# Patient Record
Sex: MALE | Race: OTHER | Employment: OTHER | URBAN - METROPOLITAN AREA
[De-identification: names, ages, dates, MRNs, and addresses within clinical notes are randomized per-mention and may not be internally consistent; named-entity substitution may affect disease eponyms.]

---

## 2021-11-04 ENCOUNTER — APPOINTMENT (OUTPATIENT)
Dept: GENERAL RADIOLOGY | Age: 26
End: 2021-11-04
Attending: EMERGENCY MEDICINE

## 2021-11-04 ENCOUNTER — HOSPITAL ENCOUNTER (EMERGENCY)
Age: 26
Discharge: HOME | End: 2021-11-04
Attending: EMERGENCY MEDICINE

## 2021-11-04 VITALS
DIASTOLIC BLOOD PRESSURE: 74 MMHG | WEIGHT: 175 LBS | HEIGHT: 70 IN | HEART RATE: 84 BPM | TEMPERATURE: 98.6 F | OXYGEN SATURATION: 98 % | BODY MASS INDEX: 25.05 KG/M2 | SYSTOLIC BLOOD PRESSURE: 118 MMHG | RESPIRATION RATE: 18 BRPM

## 2021-11-04 DIAGNOSIS — S83.91XA SPRAIN OF RIGHT KNEE, UNSPECIFIED LIGAMENT, INITIAL ENCOUNTER: Primary | ICD-10-CM

## 2021-11-04 PROCEDURE — 96372 THER/PROPH/DIAG INJ SC/IM: CPT

## 2021-11-04 PROCEDURE — 73562 X-RAY EXAM OF KNEE 3: CPT

## 2021-11-04 PROCEDURE — 99284 EMERGENCY DEPT VISIT MOD MDM: CPT

## 2021-11-04 PROCEDURE — 74011250636 HC RX REV CODE- 250/636: Performed by: PHYSICIAN ASSISTANT

## 2021-11-04 RX ORDER — IBUPROFEN 800 MG/1
800 TABLET ORAL
Qty: 20 TABLET | Refills: 0 | Status: SHIPPED | OUTPATIENT
Start: 2021-11-04 | End: 2021-11-11

## 2021-11-04 RX ORDER — KETOROLAC TROMETHAMINE 30 MG/ML
30 INJECTION, SOLUTION INTRAMUSCULAR; INTRAVENOUS
Status: COMPLETED | OUTPATIENT
Start: 2021-11-04 | End: 2021-11-04

## 2021-11-04 RX ADMIN — KETOROLAC TROMETHAMINE 30 MG: 30 INJECTION, SOLUTION INTRAMUSCULAR at 14:46

## 2021-11-04 NOTE — ED PROVIDER NOTES
Is a 24-year-old male who presents with complaint of right knee pain and injury. He was playing soccer yesterday when another player went to get the ball from him but accidentally kicked him on the medial aspect of the right knee and he felt a pop in the lateral posterior portion of the knee. He felt like his knee went \"out of place and then back into place. He woke up this morning and his knee was swollen and states that he hardly got any sleep last night because of the pain. He did not take anything for his pain. He rates it as 9 out of 10. Pain is worse with attempting to flex the knee. No prior injury to the knee. The history is provided by the patient. Knee Injury  Pertinent negatives include no chest pain, no abdominal pain and no shortness of breath. No past medical history on file. No past surgical history on file. No family history on file. Social History     Socioeconomic History    Marital status: SINGLE     Spouse name: Not on file    Number of children: Not on file    Years of education: Not on file    Highest education level: Not on file   Occupational History    Not on file   Tobacco Use    Smoking status: Never Smoker    Smokeless tobacco: Not on file   Substance and Sexual Activity    Alcohol use: No    Drug use: No    Sexual activity: Not on file   Other Topics Concern    Not on file   Social History Narrative    Not on file     Social Determinants of Health     Financial Resource Strain:     Difficulty of Paying Living Expenses: Not on file   Food Insecurity:     Worried About Running Out of Food in the Last Year: Not on file    Ulices of Food in the Last Year: Not on file   Transportation Needs:     Lack of Transportation (Medical): Not on file    Lack of Transportation (Non-Medical):  Not on file   Physical Activity:     Days of Exercise per Week: Not on file    Minutes of Exercise per Session: Not on file   Stress:     Feeling of Stress : Not on file   Social Connections:     Frequency of Communication with Friends and Family: Not on file    Frequency of Social Gatherings with Friends and Family: Not on file    Attends Roman Catholic Services: Not on file    Active Member of Clubs or Organizations: Not on file    Attends Club or Organization Meetings: Not on file    Marital Status: Not on file   Intimate Partner Violence:     Fear of Current or Ex-Partner: Not on file    Emotionally Abused: Not on file    Physically Abused: Not on file    Sexually Abused: Not on file   Housing Stability:     Unable to Pay for Housing in the Last Year: Not on file    Number of [place] in the Last Year: Not on file    Unstable Housing in the Last Year: Not on file         ALLERGIES: Patient has no known allergies. Review of Systems   Constitutional: Negative for chills and fever. HENT: Negative for sore throat. Respiratory: Negative for cough and shortness of breath. Cardiovascular: Negative for chest pain. Gastrointestinal: Negative for abdominal pain, nausea and vomiting. Musculoskeletal: Positive for joint swelling (right knee). Negative for back pain and neck pain. Right knee pain   Neurological: Negative for dizziness, weakness and numbness. Psychiatric/Behavioral: Negative for agitation and confusion. Vitals:    11/04/21 1302   BP: 118/74   Pulse: 85   Resp: 18   Temp: 98.6 °F (37 °C)   SpO2: 98%   Weight: 79.4 kg (175 lb)   Height: 5' 10\" (1.778 m)            Physical Exam  Vitals and nursing note reviewed. Constitutional:       General: He is not in acute distress. Appearance: He is not ill-appearing or toxic-appearing. HENT:      Head: Normocephalic and atraumatic. Cardiovascular:      Rate and Rhythm: Normal rate. Pulses: Normal pulses. Pulmonary:      Effort: Pulmonary effort is normal.   Musculoskeletal:      Right hip: Normal.      Right knee: Swelling and effusion present. No deformity.  Decreased range of motion. Tenderness present over the medial joint line. Right ankle: Normal.   Skin:     General: Skin is warm and dry. Neurological:      Mental Status: He is alert and oriented to person, place, and time. Psychiatric:         Mood and Affect: Mood normal.         Behavior: Behavior normal.         Thought Content: Thought content normal.         Judgment: Judgment normal.          MDM  Number of Diagnoses or Management Options  Sprain of right knee, unspecified ligament, initial encounter  Diagnosis management comments: Patient is a 61-year-old male who presents with complaint of right knee pain and swelling following injury yesterday while playing soccer. X-rays show: 1. Small joint effusion without acute osseous abnormality. Results discussed with patient. Suspect ligamentous injury. He was placed in a knee immobilizer and provided with crutches instructed to wear immobilizer and crutches until reevaluated by orthopedics. Also advised to elevate and ice the knee on and off but no more than 10 minutes at a time. Will DC with prescription for Motrin 800 mg. He is given information for follow-up with [facility] for further evaluation. Discussed reasons to return to the ER. Patient agreeable to plan.                Procedures

## 2021-11-04 NOTE — ED TRIAGE NOTES
Pt to ED in a wheelchair after injurying his right knee playing soccer yesterday. Pt states he is unable to bear weight on the extremity and states taking nothing OTC for pain but states icing his knee last night. Pt states hearing a pop and felt it go out of place and then a second pop and the knee going back in to place.     Pt masked in triage

## 2021-11-04 NOTE — ED NOTES
I have reviewed discharge instructions with the patient. The patient verbalized understanding. Patient left ED via Discharge Method: ambulatory to Home with friend. Opportunity for questions and clarification provided. Patient given 1 scripts. To continue your aftercare when you leave the hospital, you may receive an automated call from our care team to check in on how you are doing. This is a free service and part of our promise to provide the best care and service to meet your aftercare needs.  If you have questions, or wish to unsubscribe from this service please call [phone]. Thank you for Choosing our [facility].

## 2021-11-04 NOTE — DISCHARGE INSTRUCTIONS
Rest and elevate the leg, apply ice to the knee on/off but no more than 10 minutes at a time. Take motrin every 6 hours for pain/swelling. Wear knee immobilizer and use crutches until further evaluation by ortho. Call [facility] office to schedule follow up appointment .

## 2021-11-30 ENCOUNTER — ANESTHESIA EVENT (OUTPATIENT)
Dept: SURGERY | Age: 26
End: 2021-11-30

## 2021-12-01 ENCOUNTER — HOSPITAL ENCOUNTER (OUTPATIENT)
Age: 26
Setting detail: OUTPATIENT SURGERY
Discharge: HOME | End: 2021-12-01
Attending: ORTHOPAEDIC SURGERY | Admitting: ORTHOPAEDIC SURGERY

## 2021-12-01 ENCOUNTER — ANESTHESIA (OUTPATIENT)
Dept: SURGERY | Age: 26
End: 2021-12-01

## 2021-12-01 VITALS
DIASTOLIC BLOOD PRESSURE: 77 MMHG | HEART RATE: 108 BPM | TEMPERATURE: 98 F | OXYGEN SATURATION: 98 % | RESPIRATION RATE: 20 BRPM | BODY MASS INDEX: 25.83 KG/M2 | WEIGHT: 180 LBS | SYSTOLIC BLOOD PRESSURE: 165 MMHG

## 2021-12-01 LAB
COVID-19 RAPID TEST, COVR: NOT DETECTED
SOURCE, COVRS: NORMAL

## 2021-12-01 PROCEDURE — L1832 KO ADJ JNT POS R SUP PRE CST: HCPCS | Performed by: ORTHOPAEDIC SURGERY

## 2021-12-01 PROCEDURE — 76942 ECHO GUIDE FOR BIOPSY: CPT | Performed by: ORTHOPAEDIC SURGERY

## 2021-12-01 PROCEDURE — 74011250636 HC RX REV CODE- 250/636: Performed by: NURSE ANESTHETIST, CERTIFIED REGISTERED

## 2021-12-01 PROCEDURE — 76010010054 HC POST OP PAIN BLOCK

## 2021-12-01 PROCEDURE — 77030010509 HC AIRWY LMA MSK TELE -A: Performed by: ANESTHESIOLOGY

## 2021-12-01 PROCEDURE — 87635 SARS-COV-2 COVID-19 AMP PRB: CPT

## 2021-12-01 PROCEDURE — 74011250636 HC RX REV CODE- 250/636: Performed by: PHYSICIAN ASSISTANT

## 2021-12-01 PROCEDURE — 2709999900 HC NON-CHARGEABLE SUPPLY: Performed by: ORTHOPAEDIC SURGERY

## 2021-12-01 PROCEDURE — 77030002934 HC SUT MCRYL J&J -B: Performed by: ORTHOPAEDIC SURGERY

## 2021-12-01 PROCEDURE — 76010000171 HC OR TIME 2 TO 2.5 HR INTENSV-TIER 1: Performed by: ORTHOPAEDIC SURGERY

## 2021-12-01 PROCEDURE — 74011250637 HC RX REV CODE- 250/637: Performed by: ORTHOPAEDIC SURGERY

## 2021-12-01 PROCEDURE — 77030008494 HC TBNG ARTHSC IRR ARTH -B: Performed by: ORTHOPAEDIC SURGERY

## 2021-12-01 PROCEDURE — C1713 ANCHOR/SCREW BN/BN,TIS/BN: HCPCS | Performed by: ORTHOPAEDIC SURGERY

## 2021-12-01 PROCEDURE — 77030006788 HC BLD SAW OSC STRY -B: Performed by: ORTHOPAEDIC SURGERY

## 2021-12-01 PROCEDURE — 77030037837: Performed by: ORTHOPAEDIC SURGERY

## 2021-12-01 PROCEDURE — 76010010054 HC POST OP PAIN BLOCK: Performed by: ORTHOPAEDIC SURGERY

## 2021-12-01 PROCEDURE — 76060000036 HC ANESTHESIA 2.5 TO 3 HR: Performed by: ORTHOPAEDIC SURGERY

## 2021-12-01 PROCEDURE — 74011000250 HC RX REV CODE- 250: Performed by: NURSE ANESTHETIST, CERTIFIED REGISTERED

## 2021-12-01 PROCEDURE — 77030003598 HC NDL MULT/FIRE ARTH -C: Performed by: ORTHOPAEDIC SURGERY

## 2021-12-01 PROCEDURE — 77030002922 HC SUT FBRWRE ARTH -B: Performed by: ORTHOPAEDIC SURGERY

## 2021-12-01 PROCEDURE — 74011250636 HC RX REV CODE- 250/636: Performed by: ANESTHESIOLOGY

## 2021-12-01 PROCEDURE — 74011250637 HC RX REV CODE- 250/637: Performed by: ANESTHESIOLOGY

## 2021-12-01 PROCEDURE — 77030037713 HC CLOSR DEV INCIS ZIP STRY -B: Performed by: ORTHOPAEDIC SURGERY

## 2021-12-01 PROCEDURE — 77030020274 HC MISC IMPL ORTHOPEDIC: Performed by: ORTHOPAEDIC SURGERY

## 2021-12-01 PROCEDURE — 76210000026 HC REC RM PH II 1 TO 1.5 HR: Performed by: ORTHOPAEDIC SURGERY

## 2021-12-01 PROCEDURE — 77030012894: Performed by: ORTHOPAEDIC SURGERY

## 2021-12-01 PROCEDURE — 77030003602 HC NDL NRV BLK BBMI -B: Performed by: ANESTHESIOLOGY

## 2021-12-01 PROCEDURE — 77030040922 HC BLNKT HYPOTHRM STRY -A: Performed by: ANESTHESIOLOGY

## 2021-12-01 PROCEDURE — 29888 ARTHRS AID ACL RPR/AGMNTJ: CPT | Performed by: ORTHOPAEDIC SURGERY

## 2021-12-01 PROCEDURE — 76210000016 HC OR PH I REC 1 TO 1.5 HR: Performed by: ORTHOPAEDIC SURGERY

## 2021-12-01 PROCEDURE — 77030003666 HC NDL SPINAL BD -A: Performed by: ORTHOPAEDIC SURGERY

## 2021-12-01 PROCEDURE — 77030038066 HC BLD SHVR ARTH -B: Performed by: ORTHOPAEDIC SURGERY

## 2021-12-01 PROCEDURE — 77030008009 HC PRB ARTHO ABLT ARTH -C: Performed by: ORTHOPAEDIC SURGERY

## 2021-12-01 PROCEDURE — 77030018673: Performed by: ORTHOPAEDIC SURGERY

## 2021-12-01 PROCEDURE — 77030031139 HC SUT VCRL2 J&J -A: Performed by: ORTHOPAEDIC SURGERY

## 2021-12-01 PROCEDURE — 77030002933 HC SUT MCRYL J&J -A: Performed by: ORTHOPAEDIC SURGERY

## 2021-12-01 PROCEDURE — 77030028714 HC DRL BIT PIN PASS S&N -C: Performed by: ORTHOPAEDIC SURGERY

## 2021-12-01 PROCEDURE — 77030022041 HC CUTR FLIP ARTH -D: Performed by: ORTHOPAEDIC SURGERY

## 2021-12-01 DEVICE — ANCHOR SUT ANTR CRUC LIGMNT W/ FIBERTAG TIGHTROPE: Type: IMPLANTABLE DEVICE | Site: KNEE | Status: FUNCTIONAL

## 2021-12-01 DEVICE — ANCHOR SUT ANTR CRUC LIGMNT W/ FIBERTAG ATTACH BTTN SYS: Type: IMPLANTABLE DEVICE | Site: KNEE | Status: FUNCTIONAL

## 2021-12-01 DEVICE — DEVICE GRFT FIX 4.75X19.1 MM BIOCOMPOSITE SWIVELOCK: Type: IMPLANTABLE DEVICE | Site: KNEE | Status: FUNCTIONAL

## 2021-12-01 DEVICE — BUTTON FIX W8XL12MM TI ATTCH SYS ALLGRFT CONSTRUCT FOR: Type: IMPLANTABLE DEVICE | Site: KNEE | Status: FUNCTIONAL

## 2021-12-01 RX ORDER — TRANEXAMIC ACID 100 MG/ML
INJECTION, SOLUTION INTRAVENOUS AS NEEDED
Status: DISCONTINUED | OUTPATIENT
Start: 2021-12-01 | End: 2021-12-01 | Stop reason: HOSPADM

## 2021-12-01 RX ORDER — CEFAZOLIN SODIUM/WATER 2 G/20 ML
2 SYRINGE (ML) INTRAVENOUS ONCE
Status: COMPLETED | OUTPATIENT
Start: 2021-12-01 | End: 2021-12-01

## 2021-12-01 RX ORDER — SODIUM CHLORIDE, SODIUM LACTATE, POTASSIUM CHLORIDE, CALCIUM CHLORIDE 600; 310; 30; 20 MG/100ML; MG/100ML; MG/100ML; MG/100ML
75 INJECTION, SOLUTION INTRAVENOUS CONTINUOUS
Status: DISCONTINUED | OUTPATIENT
Start: 2021-12-01 | End: 2021-12-01 | Stop reason: HOSPADM

## 2021-12-01 RX ORDER — SODIUM CHLORIDE 0.9 % (FLUSH) 0.9 %
5-40 SYRINGE (ML) INJECTION AS NEEDED
Status: DISCONTINUED | OUTPATIENT
Start: 2021-12-01 | End: 2021-12-01 | Stop reason: HOSPADM

## 2021-12-01 RX ORDER — MINERAL OIL
OIL (ML) MISCELLANEOUS AS NEEDED
Status: DISCONTINUED | OUTPATIENT
Start: 2021-12-01 | End: 2021-12-01 | Stop reason: HOSPADM

## 2021-12-01 RX ORDER — FLUMAZENIL 0.1 MG/ML
0.2 INJECTION INTRAVENOUS
Status: DISCONTINUED | OUTPATIENT
Start: 2021-12-01 | End: 2021-12-01 | Stop reason: HOSPADM

## 2021-12-01 RX ORDER — EPHEDRINE SULFATE/0.9% NACL/PF 50 MG/5 ML
SYRINGE (ML) INTRAVENOUS AS NEEDED
Status: DISCONTINUED | OUTPATIENT
Start: 2021-12-01 | End: 2021-12-01 | Stop reason: HOSPADM

## 2021-12-01 RX ORDER — MIDAZOLAM HYDROCHLORIDE 1 MG/ML
2 INJECTION, SOLUTION INTRAMUSCULAR; INTRAVENOUS ONCE
Status: COMPLETED | OUTPATIENT
Start: 2021-12-01 | End: 2021-12-01

## 2021-12-01 RX ORDER — LIDOCAINE HYDROCHLORIDE 20 MG/ML
INJECTION, SOLUTION EPIDURAL; INFILTRATION; INTRACAUDAL; PERINEURAL AS NEEDED
Status: DISCONTINUED | OUTPATIENT
Start: 2021-12-01 | End: 2021-12-01 | Stop reason: HOSPADM

## 2021-12-01 RX ORDER — HYDROMORPHONE HYDROCHLORIDE 2 MG/ML
0.5 INJECTION, SOLUTION INTRAMUSCULAR; INTRAVENOUS; SUBCUTANEOUS
Status: DISCONTINUED | OUTPATIENT
Start: 2021-12-01 | End: 2021-12-01 | Stop reason: HOSPADM

## 2021-12-01 RX ORDER — DEXAMETHASONE SODIUM PHOSPHATE 4 MG/ML
INJECTION, SOLUTION INTRA-ARTICULAR; INTRALESIONAL; INTRAMUSCULAR; INTRAVENOUS; SOFT TISSUE
Status: COMPLETED | OUTPATIENT
Start: 2021-12-01 | End: 2021-12-01

## 2021-12-01 RX ORDER — SODIUM CHLORIDE, SODIUM LACTATE, POTASSIUM CHLORIDE, CALCIUM CHLORIDE 600; 310; 30; 20 MG/100ML; MG/100ML; MG/100ML; MG/100ML
100 INJECTION, SOLUTION INTRAVENOUS CONTINUOUS
Status: DISCONTINUED | OUTPATIENT
Start: 2021-12-01 | End: 2021-12-01 | Stop reason: HOSPADM

## 2021-12-01 RX ORDER — ROPIVACAINE HYDROCHLORIDE 5 MG/ML
INJECTION, SOLUTION EPIDURAL; INFILTRATION; PERINEURAL
Status: COMPLETED | OUTPATIENT
Start: 2021-12-01 | End: 2021-12-01

## 2021-12-01 RX ORDER — PROPOFOL 10 MG/ML
INJECTION, EMULSION INTRAVENOUS AS NEEDED
Status: DISCONTINUED | OUTPATIENT
Start: 2021-12-01 | End: 2021-12-01 | Stop reason: HOSPADM

## 2021-12-01 RX ORDER — DIPHENHYDRAMINE HYDROCHLORIDE 50 MG/ML
12.5 INJECTION, SOLUTION INTRAMUSCULAR; INTRAVENOUS
Status: DISCONTINUED | OUTPATIENT
Start: 2021-12-01 | End: 2021-12-01 | Stop reason: HOSPADM

## 2021-12-01 RX ORDER — NALOXONE HYDROCHLORIDE 0.4 MG/ML
0.1 INJECTION, SOLUTION INTRAMUSCULAR; INTRAVENOUS; SUBCUTANEOUS AS NEEDED
Status: DISCONTINUED | OUTPATIENT
Start: 2021-12-01 | End: 2021-12-01 | Stop reason: HOSPADM

## 2021-12-01 RX ORDER — ACETAMINOPHEN 500 MG
1000 TABLET ORAL ONCE
Status: COMPLETED | OUTPATIENT
Start: 2021-12-01 | End: 2021-12-01

## 2021-12-01 RX ORDER — LIDOCAINE HYDROCHLORIDE 10 MG/ML
0.1 INJECTION INFILTRATION; PERINEURAL AS NEEDED
Status: DISCONTINUED | OUTPATIENT
Start: 2021-12-01 | End: 2021-12-01 | Stop reason: HOSPADM

## 2021-12-01 RX ORDER — ONDANSETRON 2 MG/ML
INJECTION INTRAMUSCULAR; INTRAVENOUS AS NEEDED
Status: DISCONTINUED | OUTPATIENT
Start: 2021-12-01 | End: 2021-12-01 | Stop reason: HOSPADM

## 2021-12-01 RX ORDER — FENTANYL CITRATE 50 UG/ML
INJECTION, SOLUTION INTRAMUSCULAR; INTRAVENOUS AS NEEDED
Status: DISCONTINUED | OUTPATIENT
Start: 2021-12-01 | End: 2021-12-01 | Stop reason: HOSPADM

## 2021-12-01 RX ORDER — FENTANYL CITRATE 50 UG/ML
100 INJECTION, SOLUTION INTRAMUSCULAR; INTRAVENOUS AS NEEDED
Status: COMPLETED | OUTPATIENT
Start: 2021-12-01 | End: 2021-12-01

## 2021-12-01 RX ORDER — OXYCODONE HYDROCHLORIDE 5 MG/1
5 TABLET ORAL
Status: COMPLETED | OUTPATIENT
Start: 2021-12-01 | End: 2021-12-01

## 2021-12-01 RX ORDER — SODIUM CHLORIDE 0.9 % (FLUSH) 0.9 %
5-40 SYRINGE (ML) INJECTION EVERY 8 HOURS
Status: DISCONTINUED | OUTPATIENT
Start: 2021-12-01 | End: 2021-12-01 | Stop reason: HOSPADM

## 2021-12-01 RX ADMIN — DEXAMETHASONE SODIUM PHOSPHATE 4 MG: 4 INJECTION, SOLUTION INTRAMUSCULAR; INTRAVENOUS at 09:05

## 2021-12-01 RX ADMIN — ROPIVACAINE HYDROCHLORIDE 25 ML: 5 INJECTION, SOLUTION EPIDURAL; INFILTRATION; PERINEURAL at 09:04

## 2021-12-01 RX ADMIN — FENTANYL CITRATE 50 MCG: 50 INJECTION INTRAMUSCULAR; INTRAVENOUS at 12:45

## 2021-12-01 RX ADMIN — FENTANYL CITRATE 50 MCG: 50 INJECTION INTRAMUSCULAR; INTRAVENOUS at 11:13

## 2021-12-01 RX ADMIN — PROPOFOL 350 MG: 10 INJECTION, EMULSION INTRAVENOUS at 10:54

## 2021-12-01 RX ADMIN — TRANEXAMIC ACID 1 G: 100 INJECTION, SOLUTION INTRAVENOUS at 10:59

## 2021-12-01 RX ADMIN — CEFAZOLIN 2 G: 1 INJECTION, POWDER, FOR SOLUTION INTRAVENOUS at 10:40

## 2021-12-01 RX ADMIN — FENTANYL CITRATE 100 MCG: 0.05 INJECTION, SOLUTION INTRAMUSCULAR; INTRAVENOUS at 09:01

## 2021-12-01 RX ADMIN — SODIUM CHLORIDE, SODIUM LACTATE, POTASSIUM CHLORIDE, AND CALCIUM CHLORIDE 75 ML/HR: 600; 310; 30; 20 INJECTION, SOLUTION INTRAVENOUS at 08:09

## 2021-12-01 RX ADMIN — HYDROMORPHONE HYDROCHLORIDE 0.5 MG: 2 INJECTION INTRAMUSCULAR; INTRAVENOUS; SUBCUTANEOUS at 13:27

## 2021-12-01 RX ADMIN — FENTANYL CITRATE 50 MCG: 50 INJECTION INTRAMUSCULAR; INTRAVENOUS at 12:04

## 2021-12-01 RX ADMIN — FENTANYL CITRATE 25 MCG: 50 INJECTION INTRAMUSCULAR; INTRAVENOUS at 11:59

## 2021-12-01 RX ADMIN — HYDROMORPHONE HYDROCHLORIDE 0.5 MG: 2 INJECTION INTRAMUSCULAR; INTRAVENOUS; SUBCUTANEOUS at 13:44

## 2021-12-01 RX ADMIN — ACETAMINOPHEN 1000 MG: 500 TABLET ORAL at 08:10

## 2021-12-01 RX ADMIN — ONDANSETRON 4 MG: 2 INJECTION INTRAMUSCULAR; INTRAVENOUS at 12:33

## 2021-12-01 RX ADMIN — Medication 10 MG: at 11:25

## 2021-12-01 RX ADMIN — OXYCODONE 5 MG: 5 TABLET ORAL at 13:38

## 2021-12-01 RX ADMIN — DEXAMETHASONE SODIUM PHOSPHATE 4 MG: 4 INJECTION, SOLUTION INTRA-ARTICULAR; INTRALESIONAL; INTRAMUSCULAR; INTRAVENOUS; SOFT TISSUE at 09:04

## 2021-12-01 RX ADMIN — LIDOCAINE HYDROCHLORIDE 70 MG: 20 INJECTION, SOLUTION EPIDURAL; INFILTRATION; INTRACAUDAL; PERINEURAL at 10:54

## 2021-12-01 RX ADMIN — FENTANYL CITRATE 25 MCG: 50 INJECTION INTRAMUSCULAR; INTRAVENOUS at 11:50

## 2021-12-01 RX ADMIN — MIDAZOLAM 2 MG: 1 INJECTION INTRAMUSCULAR; INTRAVENOUS at 09:00

## 2021-12-01 RX ADMIN — PROPOFOL 50 MG: 10 INJECTION, EMULSION INTRAVENOUS at 10:57

## 2021-12-01 RX ADMIN — SODIUM CHLORIDE, SODIUM LACTATE, POTASSIUM CHLORIDE, AND CALCIUM CHLORIDE: 600; 310; 30; 20 INJECTION, SOLUTION INTRAVENOUS at 11:58

## 2021-12-01 RX ADMIN — ROPIVACAINE HYDROCHLORIDE 20 ML: 5 INJECTION, SOLUTION EPIDURAL; INFILTRATION; PERINEURAL at 09:05

## 2021-12-01 NOTE — BRIEF OP NOTE
Brief Postoperative Note    Patient: Africa Wilkins  YOB: 1995  MRN: [id number]    Date of Procedure: 12/1/2021     Pre-Op Diagnosis: Right knee pain, unspecified chronicity [M25.561]  Internal derangement of right knee [M23.91]  Rupture of anterior cruciate ligament of right knee, subsequent encounter [H59.338S]    Post-Op Diagnosis: Same as preoperative diagnosis. Procedure(s):  RIGHT KNEE ARTHROSCOPY WITH ANTERIOR CRUCIATE LIGAMENT RECONSTRUCTION W/ QUAD AUTOGRAFT    Surgeon(s):  Dalia Moran MD    Surgical Assistant: Surg Asst-1: Ann Schuster    Anesthesia: Regional     Estimated Blood Loss (mL): Minimal    Complications: None    Specimens: * No specimens in log *     Implants:   Implant Name Type Inv.  Item Serial No.  Lot No. LRB No. Used Action   ANCHOR SUT ANTR CRUC LIGMNT W/ FIBERTAG ATTACH BTTN SYS - [id number]  ANCHOR SUT ANTR CRUC LIGMNT W/ FIBERTAG ATTACH BTTN SYS  [employer] [id number] Right 1 Implanted   ANCHOR SUT ANTR CRUC LIGMNT W/ [employer] - [id number]  ANCHOR SUT ANTR CRUC LIGMNT W/ [employer]  [address] [employer] [id number] Right 1 Implanted   BUTTON FIX [id number] TI ATTCH SYS ALLGRFT CONSTRUCT FOR - [id number]  BUTTON FIX [id number] TI ATTCH SYS ALLGRFT CONSTRUCT FOR  [employer] [id number] Right 1 Implanted   DEVICE GRFT FIX 4.75X19.1 MM BIOCOMPOSITE [employer] - [id number]  DEVICE GRFT FIX 4.75X19.1 MM BIOCOMPOSITE [employer] [id number] Right 1 Implanted       Drains: * No LDAs found *    Findings: see op note    Electronically Signed by Jadyn Nagel MD on 12/1/2021 at 12:43 PM

## 2021-12-01 NOTE — OP NOTES
Operative Note    Date of Surgery: 2021      Preoperative diagnosis:  Right knee pain, unspecified chronicity [M25.561]  Internal derangement of right knee [M23.91]  Rupture of anterior cruciate ligament of right knee, subsequent encounter [S83.511D]    Postoperative diagnosis: Right knee pain, unspecified chronicity [M25.561]  Internal derangement of right knee [M23.91]  Rupture of anterior cruciate ligament of right knee, subsequent encounter [S83.511D]    Surgeon(s) and Role:     Shell Castillo MD - Primary     Assistant:  Oksana Rinaldi,  assist, assisted during the procedure. She was necessary for patient positioning, wound closure, and assistance with the major portions of the operation. Her presence decreased the operative time and potential complication rate. Anesthesia: General and regional.    Antibiotics: Ancef 2 grams IV    Tourniquet Time: * Missing tourniquet times found for documented tourniquets in lo *     Procedures:  Procedure(s):  RIGHT KNEE ARTHROSCOPY WITH ANTERIOR CRUCIATE LIGAMENT RECONSTRUCTION W/ QUAD AUTOGRAFT    02113    Findings:  1. EUA -   + lachman's and positive pivot shift. Stable to varus and valgus. 2. PFJ - Normal  3. Medial Joint -normal appearing meniscus, stable to probing, cartilage appeared normal      4. Lateral joint - normal appearing meniscus, stable to probing, cartilage appeared normal  5. PCL - stable and intact  6. ACL - acute tear of acl     Indications / Consent: This is a patient who feels unstable after an ACL tear and injury. After previous discussions and treatments using both conservative and/or non-operative treatment options the patient elected to proceed with surgery due to continued symptoms. A review of the risks and benefits, including but not limited to infection, stiffness, injury to nerves and vessels, DVT, PE, MI, need for further operations and other anesthesia related risks was performed with the patient.  After this review and the review of the likely outcome and potential complications of the procedure, preoperative verbal and written consents were obtained. The operative procedure and postoperative course were discussed with the patient in detail and the extremity was marked by the patient and myself. Procedure: The patient was given their anesthetic and placed in the supine position. An EUA was performed and positive for ACL instability as noted above. A lateral post was placed next to the operative leg and the nonoperative leg was well padded and lay secured on the table. A non-sterile tourniquet was applied to the operative leg. The leg was prepped with ChloraPrep and draped in the usual fashion. Prior to the beginning of the procedure, a time-out was performed for correct surgical site identification as was marked during the pre-operative meeting. This was confirmed using the written consent and history/physical. Time-out for antibiotic dosing, timing and selection was also performed. A 3 cm incision was made at the superior pole of patella. Using sharp dissection the small area of fatty tissue in the bursa were removed. Using blunt dissection with an elevator and a Ray-Quentin the soft tissue was removed from the superior surface of the quadriceps tendon and the VMO was identified. Using the camera from the scope the proximal extent of the quad was identified and the marker was placed on the skin superficially. The size 10 quadriceps pro cutting cannula was then marked with ink and placed on the quadriceps tendon. Using the 15 blade scalpel the medial and lateral sides of the distal graft were incised and then the quadriceps was resected off of the superior patella. A fiber loop was then placed at the distal graft. The Metzenbaum scissors were used to trim the medial and lateral sides of the distalmost aspect of the graft.   Using the quadriceps pro cutting device the graft was harvested without complication obtaining about a 67 mm graft. The graft was taken to the back table for preparation. Using 0 Vicryl the partial-thickness quadriceps graft site was then approximated. The skin layers were closed with #2 Monocryl and a 3 -0 strata fix. Circumferential graft stitches were then placed at 10 and 20 mm on the tibial side and 20 mm on the femoral side. The graft was sized and felt to be appropriate at 8.5 mm in diameter at the femoral end and about a 9.5 mm graft at the tibia. The graft was placed in a graft tube under 20 lbs. of tension on the back table. Then attention was turned back into the knee. The tourniquet was inflated to 250 mmhg. Using an 11 blade scalpel the scope was introduced through an anterolateral portal and the anteromedial portal was developed using spinal needle localization. The patellofemoral joint was viewed and felt to be unremarkable. The medial and lateral gutters were clear. The notch was then visualized and the ACL was noted to be torn. A probe was used to confirm this with a positive lateral wall sign seen. The scope was removed at this point and attention was turned to harvesting the graft. The scope was reinserted. The lateral compartment was viewed. The articular surface was normal. The lateral meniscus appeared to be normal and stable. The medial compartment was viewed, the articular surface was normal and  the medial meniscus was probed and noted to be normal.  The posteromedial and lateral compartments were viewed and otherwise normal.  A minimal notchplasty was performed. The ACL femoral and tibial footprints were cleared as much as needed. Placing the camera in the medial portal the flip cutter device guide was then placed over the anatomic side of the ACL footprint on the femur. An incision was made over the lateral distal thigh. The guide was set in place. The bone tunnel length was measured. The flip cutter was then advanced.   The appropriate sized reamer was then selected on the flip cutter device. It was back drilled approximately 20 mm. Bone debris was then cleared. A suture shuttle was then inserted. Attention was then turned to the tibial ACL attachment site. A tibial guide was then placed. The scope was switched to the lateral portal.  Using a similar technique the flip cutter device was then inserted into the center portion of the anatomic footprint of the tibia. The reamer was then set and backed drilled approximately 35 mm. A suture shuttle was then inserted. The graft was then obtained and removed from the graft tube. The button sutures were attached to this passing suture. This was then pulled through the tibia and femur. The button was pulled up into position under direct visualization into the femur and it was rotated. The graft was advanced and then the tibial loop was passed. An ABS button was attached to that side and the tibial graft component was brought into the tunnel. Once the graft was appropriately and the tibia of the femoral side was then further advanced so that appropriate amount of tendon was within the bone tunnels. This is been preoperatively marked on the graft. The knee was placed through a range of motion of at least 15 cycles. After the graft had been checked arthroscopically noting no impingement. In full extension the graft was tightened again on both the femur and the tibial side. The graft was checked and had appropriate position and tension. The Lachman's was stable and there was no longer a pivot shift noted. A drill was then used on the tibia and the tap was placed. A swivel lock anchor was placed with the ABS and other sutures into it. Backup fixation was then provided. The tourniquet was let down at this point. The subcutaneous tissue was closed with 2-0 Monocryl, skin was closed with zip loop.  The portals were closed with interrupted monofilament suture and steri-strips; sterile honeycomb dressings, SILVANA hose, and knee immobilizer were placed on the knee. The patient was returned to the recovery room in satisfactory condition. There were no known intraoperative complications. All counts were correct. Post-operative plan:  Post operative instructions are provided. Patient will be TDWB on the operative leg until otherwise instructed by PT or MD. They will start PT within a couple of days working on an ACL protocol. I will talk with the family and contact them on POD # 1. Estimated Blood Loss:   minimal.      Fluids:    see anesthesia records. Implant:   Implant Name Type Inv.  Item Serial No.  Lot No. LRB No. Used Action   ANCHOR SUT ANTR CRUC LIGMNT W/ FIBERTAG ATTACH BTTN SYS - [id number]  ANCHOR SUT ANTR CRUC LIGMNT W/ FIBERTAG ATTACH BTTN SYS  [employer] [id number] Right 1 Implanted   ANCHOR SUT ANTR CRUC LIGMNT W/ [employer] - [id number]  ANCHOR SUT ANTR CRUC LIGMNT W/ [employer]  [address] [employer] [id number] Right 1 Implanted   BUTTON FIX [id number] TI ATTCH SYS ALLGRFT CONSTRUCT FOR - [id number]  BUTTON FIX [id number] TI ATTCH SYS ALLGRFT CONSTRUCT FOR  [employer] [id number] Right 1 Implanted   DEVICE GRFT FIX 4.75X19.1 MM BIOCOMPOSITE [facility] - [id number]  DEVICE GRFT FIX 4.75X19.1 MM BI[employer]OMPOSITE [employer] [id number] Right 1 Implanted       Closure: Primary    Complications: None    Signed By: Kristyn Garcia MD

## 2021-12-01 NOTE — ANESTHESIA PROCEDURE NOTES
Peripheral Block    Start time: 12/1/2021 9:00 AM  End time: 12/1/2021 9:04 AM  Performed by: Allen Ward MD  Authorized by: Allen Ward MD       Pre-procedure:    Indications: at surgeon's request and post-op pain management    Preanesthetic Checklist: patient identified, risks and benefits discussed, site marked, timeout performed, anesthesia consent given and patient being monitored    Timeout Time: 09:00 EST          Block Type:   Block Type:  Popliteal  Laterality:  Right  Monitoring:  Standard ASA monitoring, continuous pulse ox, heart rate, oxygen, responsive to questions and frequent vital sign checks  Injection Technique:  Single shot  Procedures: ultrasound guided    Patient Position: supine  Prep: chlorhexidine    Location:  Lower thigh  Needle Type:  Stimuplex  Needle Gauge:  20 G  Needle Localization:  Ultrasound guidance  Medication Injected:  Ropivacaine (PF) (NAROPIN)(0.5%) 5 mg/mL injection, 25 mL  dexamethasone (DECADRON) 4 mg/mL injection, 4 mg  Med Admin Time: 12/1/2021 9:04 AM    Assessment:  Number of attempts:  1  Injection Assessment:  Incremental injection every 5 mL, local visualized surrounding nerve on ultrasound, negative aspiration for blood, no paresthesia, ultrasound image on chart and no intravascular symptoms  Patient tolerance:  Patient tolerated the procedure well with no immediate complications

## 2021-12-01 NOTE — ANESTHESIA PROCEDURE NOTES
Peripheral Block    Start time: 12/1/2021 9:04 AM  End time: 12/1/2021 9:05 AM  Performed by: Berna Pierre MD  Authorized by: Berna Pierre MD       Pre-procedure: Indications: at surgeon's request and post-op pain management    Preanesthetic Checklist: patient identified, risks and benefits discussed, site marked, timeout performed, anesthesia consent given and patient being monitored    Timeout Time: 09:04 EST          Block Type:   Block Type:   Adductor canal  Laterality:  Right  Monitoring:  Standard ASA monitoring, continuous pulse ox, heart rate, responsive to questions, oxygen and frequent vital sign checks  Injection Technique:  Single shot  Procedures: ultrasound guided    Patient Position: supine  Prep: chlorhexidine    Location:  Mid thigh  Needle Type:  Stimuplex  Needle Gauge:  20 G  Needle Localization:  Ultrasound guidance  Medication Injected:  Ropivacaine (PF) (NAROPIN)(0.5%) 5 mg/mL injection, 20 mL  dexamethasone (DECADRON) 4 mg/mL injection, 4 mg  Med Admin Time: 12/1/2021 9:05 AM    Assessment:  Number of attempts:  1  Injection Assessment:  Incremental injection every 5 mL, local visualized surrounding nerve on ultrasound, negative aspiration for blood, no intravascular symptoms, no paresthesia and ultrasound image on chart  Patient tolerance:  Patient tolerated the procedure well with no immediate complications

## 2021-12-01 NOTE — DISCHARGE INSTRUCTIONS
POST-OPERATIVE INFORMATION ACL RECONSTRUCTION    Returning Home  Your pain after surgery will vary depending on the method of anesthesia used and from patient to patient. In the first 24 hours, pain medication should be taken regularly with small amounts of food. During this time, nausea and light-headedness are common and should improve in 2-5 days. Drinking fluids may help. If nausea persists, medicine can be prescribed by calling your doctor at [phone]. Leaving the Outpatient Surgery Center:     As you leave the surgery center you might be given a CPM (continuous passive motion) machine and/or a Cold Therapy unit (Cryocuff or Game Ready). The CPM may be delivered to your house as well. The CPM is used to help maintain your motion. YOU MAY START THE CPM THE DAY AFTER YOUR SURGERY. You may remove your brace to use your CPM machine. If you are able to, sleep with the machine on. If you are not able to sleep with the CPM, sleep with the immobilizer and use the CPM machine multiple times throughout the day (4-6 hours per day). Each day you may increase the flexion setting as you tolerate. You do not have to go farther than 90 degrees of flexion if you eventually reach that. You may start the CPM on the day of your surgery or the following day. You may notice your bandage is a little more saturated once you begin the motion. For Cold Therapy, always have a layer in between your skin and the wrap. The cold is to be used to help control pain and swelling. Follow the instructions given to you on operating the machine. You may take the ice wrap off when you are not icing. Dont use the cold therapy while using the CPM.    For the first week:   1. When lying in bed keep your knee higher than your heart to help with swelling. 2. Use crutches when out of bed. 3. When walking, you may touch your foot to the ground for balance as you feel comfortable.    4. The cryotherapy cold sleeve, will be put on in recovery to control swelling. The position of the cryocuff is critical. Make sure the cuff is empty when you tighten it down and then fill it. Make the top strap snug and the bottom strap looser. Re-chill the water once an hour or as needed. Take the cold therapy wrap off when going outside the home. 5. Wear immobilizer to sleep at night and when you are up and about. You may take the immobilizer off at home to work on knee and ankle motion. Care of your Incisions  1. The incision is often checked 6 to 10 days after surgery. 2. Moderate bleeding may occur at the incision sites. This should decrease quickly over time. 3. Leave the dressings from surgery in place for 3-4 days. The bulky dressings may be removed and replaced with fresh gauze at that time, but leave on the small tape strips on the incision sites. Watch the wound for increasing redness, tenderness, swelling, and pus drainage daily. These can be early signs of infection. If you notice any of these signs of infection please call at [phone]. A mild fever during the first few days after surgery is not uncommon. This often occurs and can be treated with deep breathing, coughing to clear the lungs, and walking with crutches. However, fevers, increasing pain, and swelling at the incisions should be reported immediately. Showering:   Until your first post operative visit, you should consider wrapping your knee in saran wrap with tape for showering.  A plastic chair in your shower will allow you to sit.  Sponge bathing is also an option.  In general, once cleared by your physician you may allow your incisions to get wet in the shower. Post-Operative Pain Management  ANESTHESIA: You will meet with an anesthesiologist on the day of surgery to discuss your anesthesia. You will have general anesthesia and often will have a femoral nerve block.  This will wear off so be ready to begin you pain medications in order to prevent a long period without pain control. MEDICATIONS: You will be given a prescription for medications. Please take them as directed on the label and with food.  Certain pain medications may contain Tylenol(Acetaminophen). It is important not to take any additional Tylenol while on these pain medications.  Do not mix your pain medications with alcohol.  You should not drive while taking pain medications as they increase your liability and delay your responses.  Your physician will most likely prescribe to you Aspirin 325 mg (ECASA) daily for three weeks after surgery. This is done in order to help minimize the risk for a blood clot from developing, which is a possible complication after any surgery.  You will go home with white stockings on your legs called SILVANA hose. They are used to help with swelling and blood clots. After your first visit they will usually be discontinued.  If you have any questions or concerns regarding your medications, please call the office. · Common side effects of the narcotics include nausea, vomiting, drowsiness, constipation, and difficulty urinating. If you experience constipation, drink lots of water/Gatorade, avoid soda and diet drinks. Eat plenty of fiber. You may take a stool softener: Colace 100mg twice a day for the first week. For SEVERE constipation use magnesium citrate, one 8 oz bottle. All can be bought at the pharmacy. Diet and General Conditioning   Aerobic conditioning and diet are both very important after surgery. In general, we recommend that you make sure to avoid skipping meals, eat a balanced diet including regular portions of fruits and vegetables, and avoid relying on fast foods while you are recovering from surgery. Also, consider taking a daily multi-vitamin. Participate in some form of aerobic conditioning after surgery. Speak with your physical therapist or call our office to determine an appropriate form of exercise after surgery. Initially, your exercise will need to be modified after surgery. Follow-up Visits   Doctor  Plan on seeing your surgeon at 1 week, 1 month, 3 months, and 6 months after surgery. If your knee does not progress as planned, you are welcome to schedule additional visits. There is usually no charge for surgery related visits 90 days following surgery. You will receive a bill for any x-rays or special equipment (such as a brace). Physical Therapy   Your Therapist may schedule more visits depending on your progress.  First visit 2-5 days after surgery.  Weekly visits from week 1 through week 6.  Then every other week for one month.  Monthly from 3 months through 6 months. Physical Therapy  Goals for the first week  1. *Maintain maximum extension (straightening). 2. * Minimize or eliminate swelling. 3. Activate the thigh muscle. 4. Achieve greater than 90 degrees of bending. 5. Promote incision healing. Home Exercises Begin performing these exercises within the first 24 hours following surgery. 1. Throughout the day, take off the immobilizer and ice machine. Prop your foot up on pillows for 10 minutes so that your knee is not supported. Allow the knee to straighten fully. Do quad sets periodically by tightening the muscle on top of the thigh so that your knee cap moves toward you. Hold for five seconds and relax. 2. Perform heel slides multiple times throughout the day. Take the brace and ice off and slide your heel toward your bottom within a comfortable range of motion; help the leg with your hands. 3. Perform straight leg lifts in the knee immobilizer a couple times a day. Start by doing a quad set (above). Then lift your entire leg off the table starting with the heel. The knee should not bend. If it does, you should not perform this exercise. 4. Perform ankle pumps by moving foot up and down. Do these throughout the day. When to stop using  Immobilizer and crutches:  Your Physical Therapist will help you to determine the appropriate time to wean out of your brace and off of crutches. Use these guidelines to help.  Gradually wean off the crutches after the first 2-3 weeks. You may place as much weight on your leg in the brace with the crutches as you feel comfortable with. Begin with 2 crutches all the time, then one crutch at home and 2 outside, no crutches at home and one outside, no crutches.  When you can straighten your leg fully and do a straight leg lift you can wean off the immobilizer for day use, still sleep in it.  When your knee is straight for 3 weeks then you can wean out of the immobilizer when you sleep. Your knee must remain straight. Cold Therapy: You can discontinue using the cryocuff or game ready as the pain decreases. You may use cold therapy for control of pain and swelling. Use for 20-30 minutes at a time throughout the day as you desire. Issues after Surgery   Clicks and Pops- it is common for patients to experience sensations of clicks and pops in the first few months after surgery. It will resolve with time.  Pain around or just below the knee cap is common after surgery. It will resolve as your quad muscle strengthens with physical therapy. If you call the office please have us paged instead of leaving a voice mail so that we can immediately take care of your needs.    Phone [phone]              Fax [phone]

## 2021-12-01 NOTE — ANESTHESIA POSTPROCEDURE EVALUATION
Procedure(s):  RIGHT KNEE ARTHROSCOPY WITH ANTERIOR CRUCIATE LIGAMENT RECONSTRUCTION W/ QUAD AUTOGRAFT. general    Anesthesia Post Evaluation      Multimodal analgesia: multimodal analgesia used between 6 hours prior to anesthesia start to PACU discharge  Patient location during evaluation: PACU  Patient participation: complete - patient participated  Level of consciousness: awake and alert  Pain management: adequate  Airway patency: patent  Anesthetic complications: no  Cardiovascular status: acceptable  Respiratory status: acceptable  Hydration status: acceptable  Post anesthesia nausea and vomiting:  controlled  Final Post Anesthesia Temperature Assessment:  Normothermia (36.0-37.5 degrees C)      INITIAL Post-op Vital signs:   Vitals Value Taken Time   /88 12/01/21 1414   Temp 36.7 °C (98 °F) 12/01/21 1308   Pulse 100 12/01/21 1418   Resp 20 12/01/21 1345   SpO2 97 % 12/01/21 1418   Vitals shown include unvalidated device data.

## 2021-12-01 NOTE — H&P
Outpatient Surgery History and Physical:  Carlos Amador was seen and examined. CHIEF COMPLAINT:   Right knee pain. PE:  Visit Vitals  /64   Pulse 67   Temp 99 °F (37.2 °C)   Resp 14   Wt 180 lb (81.6 kg)   SpO2 99%   BMI 25.83 kg/m²       Heart:   Regular rhythm, regular pulses. Lungs:  Are clear, non-labored respirations. Past Medical History: There are no problems to display for this patient. Surgical History: History reviewed. No pertinent surgical history. Social History: Patient  reports that he has never smoked. He has never used smokeless tobacco. He reports current drug use. Drug: Marijuana. He reports that he does not drink alcohol. Family History: No family history on file. Allergies: Reviewed per EMR  No Known Allergies    Medications:    No current facility-administered medications on file prior to encounter. No current outpatient medications on file prior to encounter. The surgery is planned for the right knee. Quadriceps tendon autograft for ACL reconstruction, and possible meniscal repair vs partial menisectomy if found intraoperatively. History and physical has been reviewed. The patient has been examined. There have been no significant clinical changes since the completion of the originally dated History and Physical.  Patient identified by surgeon; surgical site was confirmed by patient and surgeon. The patient is here today for outpatient surgery. I have examined the patient, no changes are noted in the patient's medical status. Necessity for the procedure/care is still present and the history and physical above is current. See the office notes for the full long term history of the problem. Please see the recent office notes for the musculoskeletal examination. We have already discussed the clinical implications of both conservative and operative treatments. They would like to proceed with operative treatment.   I talked with them extensively about the risks, benefits, reasonable expectations and expected recovery time including long term need for ambulatory assistance as well as possible complications including but not limited to bleeding, infection, need for hardware removal or exchange, neurovascular injury, stiffness, pain, dislocation, continued problems, DVT, PE, hardware failure, other fracture, need for further surgery, heterotopic ossification, MI and other anesthesia related risks, etc. They have exhausted all other options and wish to proceed. The patient was counseled at length about the risks of lucy Covid-19 during their perioperative period and any recovery window from their procedure. The patient was made aware that lucy Covid-19  may worsen their prognosis for recovering from their procedure and lend to a higher morbidity and/or mortality risk. All material risks, benefits, and reasonable alternatives including postponing the procedure were discussed. The patient does  wish to proceed with the procedure at this time.       Signed By: Kristyn Garcia MD     December 1, 2021 7:07 AM

## 2021-12-01 NOTE — ANESTHESIA PREPROCEDURE EVALUATION
Relevant Problems   No relevant active problems       Anesthetic History   No history of anesthetic complications            Review of Systems / Medical History  Patient summary reviewed and pertinent labs reviewed    Pulmonary          Smoker         Neuro/Psych   Within defined limits           Cardiovascular  Within defined limits                Exercise tolerance: >4 METS: Prior to injury was active and denied chest pain, SOB, syncope      GI/Hepatic/Renal  Within defined limits              Endo/Other  Within defined limits           Other Findings              Physical Exam    Airway  Mallampati: II  TM Distance: 4 - 6 cm  Neck ROM: normal range of motion   Mouth opening: Normal     Cardiovascular  Regular rate and rhythm,  S1 and S2 normal,  no murmur, click, rub, or gallop  Rhythm: regular  Rate: normal         Dental  No notable dental hx       Pulmonary  Breath sounds clear to auscultation               Abdominal  GI exam deferred       Other Findings            Anesthetic Plan    ASA: 2  Anesthesia type: general  Plan for LMA    Post-op pain plan if not by surgeon: peripheral nerve block single    Induction: Intravenous  Anesthetic plan and risks discussed with: Patient

## 2021-12-17 ENCOUNTER — HOSPITAL ENCOUNTER (OUTPATIENT)
Dept: PHYSICAL THERAPY | Age: 26
Discharge: HOME | End: 2021-12-17
Attending: ORTHOPAEDIC SURGERY

## 2021-12-17 DIAGNOSIS — Z09 SURGERY FOLLOW-UP: ICD-10-CM

## 2021-12-17 DIAGNOSIS — S83.511S RUPTURE OF ANTERIOR CRUCIATE LIGAMENT OF RIGHT KNEE, SEQUELA: ICD-10-CM

## 2021-12-17 DIAGNOSIS — M25.561 RIGHT KNEE PAIN, UNSPECIFIED CHRONICITY: ICD-10-CM

## 2021-12-17 PROCEDURE — 97161 PT EVAL LOW COMPLEX 20 MIN: CPT

## 2021-12-17 NOTE — THERAPY EVALUATION
Irlanda Traylor  : 1995  Payor: /  [facility] [address] [facility], [facility], [facility] [address] [facility]  [phone]   [phone]                OUTPATIENT PHYSICAL THERAPY: Initial Assessment 2021     ICD-10 Treatment Diagnosis:  Rupture of anterior cruciate ligament of right knee, sequela (S83.511S)  Pain in right knee (M25.561)    PRECAUTIONS/ALLERGIES:  Patient has no known allergies. FALL RISK SCORE: 2 (? 5 = High Risk)    MD ORDERS: Eval and Treat MEDICAL/REFERRING DIAGNOSIS:  Rupture of anterior cruciate ligament of right knee, sequela [S83.511S]  Right knee pain, unspecified chronicity [M25.561]  Surgery follow-up [Z09]     DATE OF ONSET: 2021 surgery date    REFERRING PHYSICIAN: Lula Russ MD    RETURN PHYSICIAN APPOINTMENT: TBD by patient      Ambulatory/Rehab Services H2 Model Falls Risk Assessment    Risk Factors:       (1)  Gender [Male] Ability to Rise from Chair:       (1)  Pushes up, successful in one attempt    Falls Prevention Plan:       Physical Limitations to Exercise (specify):  decreased strength, ROM, balance   Total: (5 or greater = High Risk): 2     [facility] of [employer]. All Rights Reserved. [facility] Patent #[id number]. Federal Law prohibits the replication, distribution or use without written permission from [address] [place]: Mr. Lindsay Pickering presents to physical therapy for initial evaluation regarding complaints of R knee pain s/p R ACL reconstruction on 2021 per Dr. Osiel Meade. He demonstrates decreased strength, range of motion, and activity tolerance secondary to his impairments. His score on the LEFS objective measure indicates a significant disability and loss of functional mobility.  He would benefit from skilled physical therapy to address the aforementioned deficits and maximize independence to optimize functional mobility and return to his prior level of function. Following the assessment, Mr. Daphnie Farooq mother informed our clinic staff that Mr. Vero Naranjo would be participating in physical therapy elsewhere due to financial strain. PT emphasized the importance of consistent, intentional performance with a home exercise program, especially considering his delay in starting therapy after surgery. PT reviewed exercises with Mr. Vero Naranjo to initiate an exercise program until he starts therapy at his new clinic. He verbalized understanding of instruction. PROBLEM LIST:   1. Decreased Strength   2. Decreased ADL/Functional Activities  3. Decreased Transfer Abilities  4. Decreased Ambulation Ability/Techniques  5. Decreased Balance  6. Increased Pain  7. Decreased Activity Tolerance  8. Decreased [place] with Home Exercise Program      TREATMENT PLAN:  Effective Dates: 12/17/2021 TO 12/17/2021  Frequency/Duration: 1 time for 1 week    Thank you for this referral,    Isabella Ritter PT, DPT    Referring Physician Signature: Garcia Hicks MD *No signature required for this document. HISTORY:     HISTORY OF PRESENT INJURY / ILLNESS:  Mr. Vero Naranjo presents to physical therapy s/p R ACL reconstruction on 12/01/2021 per Dr. Matt Laguna. He reports his delayed start with PT was because he was not instructed when to start PT. He states his injury occurred the first week of November when he was playing intense pick-up soccer with some friends and someone came from behind him and swiped his feet out from underneath him. He notes he heard an audible \"pop\". He denies any history of previous injuries. He states he has been having some significant anxiety over the past few weeks and reports several \"attacks\" on occasion. He reports that he informed Dr. Matt Laguna. 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  He reports his highest rating of pain in the last week was a 5/10, while his lowest rating is a 1/10. He was previously independent with functional mobility and ADLs. PAST MEDICAL HISTORY/COMORBIDITIES:  Mr. Rafael Hernandez  has a past medical history of Marijuana smoker (11/29/2021). He has no past medical history of Dementia ([facility] [address] Endocrine disease, Gastrointestinal disorder, Infectious disease, Neurological disorder, or Sleep disorder. Mr. Rafael Hernandez  has no past surgical history on file. Active Ambulatory Problems     Diagnosis Date Noted    No Active Ambulatory Problems     Resolved Ambulatory Problems     Diagnosis Date Noted    No Resolved Ambulatory Problems     Past Medical History:   Diagnosis Date    Marijuana smoker 11/29/2021     CURRENT MEDICATIONS:   Current Outpatient Medications:     senna-docusate (PERICOLACE) 8.6-50 mg per tablet, Take 1 Tablet by mouth daily. To start after surgery  Indications: constipation, Disp: 21 Tablet, Rfl: 0    ondansetron (ZOFRAN ODT) 4 mg disintegrating tablet, 1 Tablet by SubLINGual route every six (6) hours as needed for Nausea or Nausea or Vomiting.  To start after surgery  Indications: prevent nausea and vomiting after surgery, Disp: 20 Tablet, Rfl: 0     Date Last Reviewed: 12/17/2021     Number of Personal Factors/Comorbidities that affect the Plan of Care: 1-2: MODERATE COMPLEXITY   EXAMINATION:     PALPATION Date: 12/17/21   Location of tenderness Global tenderness around R knee   Patellar mobility Limited in each direction     ACTIVE ROM Date: 12/17/21    RIGHT LEFT   Knee Extension 0 -5   Knee Flexion 30 140     FUNCTIONAL MOBILITY Date: 12/17/21   Bed mobility Increased time & effort to complete   Transfers Increased time & effort to complete   Gait Decreased isiah and step length with B crutches and knee immobilizer on R knee     NEUROLOGICAL SCREEN  - No radiating symptoms down leg     SKIN INTEGRITY  - Clean, dry, and intact with bandages applied     Body Structures Involved:  1. Bones  2. Joints  3. Muscles  4. Ligaments Body Functions Affected:  1. Sensory/Pain  2. Neuromusculoskeletal  3. Movement Related Activities and Participation Affected:  1. Mobility  2. Self Care  3. Domestic Life  4. Interpersonal Interactions and Relationships  5. Community, Social, and [place]   Number of elements that affect the Plan of Care: 4+: HIGH COMPLEXITY   CLINICAL PRESENTATION:   Presentation: Stable and uncomplicated: LOW COMPLEXITY   CLINICAL DECISION MAKING:   OUTCOME MEASURES:   Tool Used: Lower Extremity Functional Scale (LEFS)  Score: Initial: 23/80 (Date: 12/17/21)   Interpretation of Score: 20 questions each scored on a 5-point scale with 0 representing \"extreme difficulty or unable to perform\" and 4 representing \"no difficulty\". The lower the score, the greater the functional disability. 80/80 represents no disability. Minimal detectable change is 9 points. Payor: /     MEDICAL NECESSITY:  · Skilled intervention continues to be required due to above deficits affecting participation in basic ADLs and overall functional tolerance. REASON FOR SERVICES/ OTHER COMMENTS:  · Patient continues to require skilled intervention due to impairments affecting functional mobility. Use of outcome tool(s) and clinical judgment create a POC that gives a: Questionable prediction of patient's progress: MODERATE COMPLEXITY        TREATMENT/SESSION ASSESSMENT: Patient verbalized understanding of HEP and role of PT. Patient's mother informing staff at the end of the session that the patient would be attending a different clinic due to financial strain. PT emphasized the importance of consistent performance of home exercise program to promote knee ROM and quad strengthening. PT discussed at length the urgency of initiating quad engagement. He demonstrated understanding of education and instruction.     · Pain: Initial: 1/10 Post Session: 1/10 ·   Compliance with Program/Exercises: N/A  · Recommendations/Intent for next treatment session: Patient will be participating in therapy elsewhere.     TOTAL TREATMENT DURATION:  PT Patient Time In/Time Out  Time In: 1100  Time Out: Jared Abarca, PT, DPT

## 2022-03-01 PROBLEM — Z09 SURGERY FOLLOW-UP: Status: ACTIVE | Noted: 2022-03-01

## 2022-03-01 PROBLEM — S83.511A RUPTURE OF ANTERIOR CRUCIATE LIGAMENT OF RIGHT KNEE: Status: ACTIVE | Noted: 2022-03-01

## 2022-03-07 PROBLEM — M25.861 CYCLOPS LESION OF RIGHT KNEE: Status: ACTIVE | Noted: 2022-03-07

## 2022-03-12 ENCOUNTER — ANESTHESIA EVENT (OUTPATIENT)
Dept: SURGERY | Age: 27
End: 2022-03-12

## 2022-03-14 ENCOUNTER — HOSPITAL ENCOUNTER (OUTPATIENT)
Dept: PHYSICAL THERAPY | Age: 27
Discharge: HOME | End: 2022-03-14
Attending: ORTHOPAEDIC SURGERY

## 2022-03-14 ENCOUNTER — HOSPITAL ENCOUNTER (OUTPATIENT)
Age: 27
Setting detail: OUTPATIENT SURGERY
Discharge: HOME | End: 2022-03-14
Attending: ORTHOPAEDIC SURGERY | Admitting: ORTHOPAEDIC SURGERY

## 2022-03-14 ENCOUNTER — ANESTHESIA (OUTPATIENT)
Dept: SURGERY | Age: 27
End: 2022-03-14

## 2022-03-14 VITALS
RESPIRATION RATE: 16 BRPM | HEART RATE: 99 BPM | BODY MASS INDEX: 24.39 KG/M2 | SYSTOLIC BLOOD PRESSURE: 157 MMHG | TEMPERATURE: 97.6 F | WEIGHT: 170 LBS | DIASTOLIC BLOOD PRESSURE: 96 MMHG | OXYGEN SATURATION: 100 %

## 2022-03-14 PROBLEM — M24.561 FLEXION CONTRACTURE OF RIGHT KNEE: Status: ACTIVE | Noted: 2022-03-14

## 2022-03-14 PROCEDURE — 77030000032 HC CUF TRNQT ZIMM -B: Performed by: ORTHOPAEDIC SURGERY

## 2022-03-14 PROCEDURE — 76060000033 HC ANESTHESIA 1 TO 1.5 HR: Performed by: ORTHOPAEDIC SURGERY

## 2022-03-14 PROCEDURE — 77030010509 HC AIRWY LMA MSK TELE -A: Performed by: ANESTHESIOLOGY

## 2022-03-14 PROCEDURE — 74011250637 HC RX REV CODE- 250/637: Performed by: ANESTHESIOLOGY

## 2022-03-14 PROCEDURE — 76010000149 HC OR TIME 1 TO 1.5 HR: Performed by: ORTHOPAEDIC SURGERY

## 2022-03-14 PROCEDURE — 77030019908 HC STETH ESOPH SIMS -A: Performed by: ANESTHESIOLOGY

## 2022-03-14 PROCEDURE — 74011000250 HC RX REV CODE- 250: Performed by: NURSE ANESTHETIST, CERTIFIED REGISTERED

## 2022-03-14 PROCEDURE — 74011250636 HC RX REV CODE- 250/636: Performed by: ORTHOPAEDIC SURGERY

## 2022-03-14 PROCEDURE — 74011000258 HC RX REV CODE- 258: Performed by: NURSE ANESTHETIST, CERTIFIED REGISTERED

## 2022-03-14 PROCEDURE — 76210000026 HC REC RM PH II 1 TO 1.5 HR: Performed by: ORTHOPAEDIC SURGERY

## 2022-03-14 PROCEDURE — 2709999900 HC NON-CHARGEABLE SUPPLY: Performed by: ORTHOPAEDIC SURGERY

## 2022-03-14 PROCEDURE — 74011250636 HC RX REV CODE- 250/636: Performed by: NURSE ANESTHETIST, CERTIFIED REGISTERED

## 2022-03-14 PROCEDURE — 77030038066 HC BLD SHVR ARTH -B: Performed by: ORTHOPAEDIC SURGERY

## 2022-03-14 PROCEDURE — 29884 ARTHRS KNEE SURG LYSIS ADS: CPT | Performed by: ORTHOPAEDIC SURGERY

## 2022-03-14 PROCEDURE — 77030040936 HC WND COBLATN S&N -C: Performed by: ORTHOPAEDIC SURGERY

## 2022-03-14 PROCEDURE — 76210000063 HC OR PH I REC FIRST 0.5 HR: Performed by: ORTHOPAEDIC SURGERY

## 2022-03-14 PROCEDURE — 74011250636 HC RX REV CODE- 250/636: Performed by: ANESTHESIOLOGY

## 2022-03-14 RX ORDER — SODIUM CHLORIDE 0.9 % (FLUSH) 0.9 %
5-40 SYRINGE (ML) INJECTION AS NEEDED
Status: DISCONTINUED | OUTPATIENT
Start: 2022-03-14 | End: 2022-03-14 | Stop reason: HOSPADM

## 2022-03-14 RX ORDER — LIDOCAINE HYDROCHLORIDE 20 MG/ML
INJECTION, SOLUTION EPIDURAL; INFILTRATION; INTRACAUDAL; PERINEURAL AS NEEDED
Status: DISCONTINUED | OUTPATIENT
Start: 2022-03-14 | End: 2022-03-14 | Stop reason: HOSPADM

## 2022-03-14 RX ORDER — SODIUM CHLORIDE 0.9 % (FLUSH) 0.9 %
5-40 SYRINGE (ML) INJECTION EVERY 8 HOURS
Status: DISCONTINUED | OUTPATIENT
Start: 2022-03-14 | End: 2022-03-14 | Stop reason: HOSPADM

## 2022-03-14 RX ORDER — FLUMAZENIL 0.1 MG/ML
0.2 INJECTION INTRAVENOUS
Status: DISCONTINUED | OUTPATIENT
Start: 2022-03-14 | End: 2022-03-14 | Stop reason: HOSPADM

## 2022-03-14 RX ORDER — DEXAMETHASONE SODIUM PHOSPHATE 4 MG/ML
INJECTION, SOLUTION INTRA-ARTICULAR; INTRALESIONAL; INTRAMUSCULAR; INTRAVENOUS; SOFT TISSUE AS NEEDED
Status: DISCONTINUED | OUTPATIENT
Start: 2022-03-14 | End: 2022-03-14 | Stop reason: HOSPADM

## 2022-03-14 RX ORDER — ACETAMINOPHEN 500 MG
1000 TABLET ORAL ONCE
Status: COMPLETED | OUTPATIENT
Start: 2022-03-14 | End: 2022-03-14

## 2022-03-14 RX ORDER — PROPOFOL 10 MG/ML
INJECTION, EMULSION INTRAVENOUS AS NEEDED
Status: DISCONTINUED | OUTPATIENT
Start: 2022-03-14 | End: 2022-03-14 | Stop reason: HOSPADM

## 2022-03-14 RX ORDER — ONDANSETRON 2 MG/ML
INJECTION INTRAMUSCULAR; INTRAVENOUS AS NEEDED
Status: DISCONTINUED | OUTPATIENT
Start: 2022-03-14 | End: 2022-03-14 | Stop reason: HOSPADM

## 2022-03-14 RX ORDER — ROPIVACAINE HYDROCHLORIDE 5 MG/ML
INJECTION, SOLUTION EPIDURAL; INFILTRATION; PERINEURAL AS NEEDED
Status: DISCONTINUED | OUTPATIENT
Start: 2022-03-14 | End: 2022-03-14 | Stop reason: HOSPADM

## 2022-03-14 RX ORDER — LIDOCAINE HYDROCHLORIDE 10 MG/ML
0.1 INJECTION INFILTRATION; PERINEURAL AS NEEDED
Status: DISCONTINUED | OUTPATIENT
Start: 2022-03-14 | End: 2022-03-14 | Stop reason: HOSPADM

## 2022-03-14 RX ORDER — FENTANYL CITRATE 50 UG/ML
INJECTION, SOLUTION INTRAMUSCULAR; INTRAVENOUS AS NEEDED
Status: DISCONTINUED | OUTPATIENT
Start: 2022-03-14 | End: 2022-03-14 | Stop reason: HOSPADM

## 2022-03-14 RX ORDER — LABETALOL HYDROCHLORIDE 5 MG/ML
INJECTION, SOLUTION INTRAVENOUS AS NEEDED
Status: DISCONTINUED | OUTPATIENT
Start: 2022-03-14 | End: 2022-03-14 | Stop reason: HOSPADM

## 2022-03-14 RX ORDER — KETOROLAC TROMETHAMINE 30 MG/ML
INJECTION, SOLUTION INTRAMUSCULAR; INTRAVENOUS AS NEEDED
Status: DISCONTINUED | OUTPATIENT
Start: 2022-03-14 | End: 2022-03-14 | Stop reason: HOSPADM

## 2022-03-14 RX ORDER — KETAMINE HYDROCHLORIDE 50 MG/ML
INJECTION, SOLUTION INTRAMUSCULAR; INTRAVENOUS AS NEEDED
Status: DISCONTINUED | OUTPATIENT
Start: 2022-03-14 | End: 2022-03-14 | Stop reason: HOSPADM

## 2022-03-14 RX ORDER — HYDROMORPHONE HYDROCHLORIDE 2 MG/ML
INJECTION, SOLUTION INTRAMUSCULAR; INTRAVENOUS; SUBCUTANEOUS AS NEEDED
Status: DISCONTINUED | OUTPATIENT
Start: 2022-03-14 | End: 2022-03-14 | Stop reason: HOSPADM

## 2022-03-14 RX ORDER — CEFAZOLIN SODIUM/WATER 2 G/20 ML
2 SYRINGE (ML) INTRAVENOUS ONCE
Status: DISCONTINUED | OUTPATIENT
Start: 2022-03-14 | End: 2022-03-14 | Stop reason: HOSPADM

## 2022-03-14 RX ORDER — MIDAZOLAM HYDROCHLORIDE 1 MG/ML
INJECTION, SOLUTION INTRAMUSCULAR; INTRAVENOUS AS NEEDED
Status: DISCONTINUED | OUTPATIENT
Start: 2022-03-14 | End: 2022-03-14 | Stop reason: HOSPADM

## 2022-03-14 RX ORDER — DIPHENHYDRAMINE HYDROCHLORIDE 50 MG/ML
12.5 INJECTION, SOLUTION INTRAMUSCULAR; INTRAVENOUS
Status: DISCONTINUED | OUTPATIENT
Start: 2022-03-14 | End: 2022-03-14 | Stop reason: HOSPADM

## 2022-03-14 RX ORDER — SODIUM CHLORIDE, SODIUM LACTATE, POTASSIUM CHLORIDE, CALCIUM CHLORIDE 600; 310; 30; 20 MG/100ML; MG/100ML; MG/100ML; MG/100ML
75 INJECTION, SOLUTION INTRAVENOUS CONTINUOUS
Status: DISCONTINUED | OUTPATIENT
Start: 2022-03-14 | End: 2022-03-14 | Stop reason: HOSPADM

## 2022-03-14 RX ORDER — SODIUM CHLORIDE, SODIUM LACTATE, POTASSIUM CHLORIDE, CALCIUM CHLORIDE 600; 310; 30; 20 MG/100ML; MG/100ML; MG/100ML; MG/100ML
100 INJECTION, SOLUTION INTRAVENOUS CONTINUOUS
Status: DISCONTINUED | OUTPATIENT
Start: 2022-03-14 | End: 2022-03-14 | Stop reason: HOSPADM

## 2022-03-14 RX ORDER — NALOXONE HYDROCHLORIDE 0.4 MG/ML
0.1 INJECTION, SOLUTION INTRAMUSCULAR; INTRAVENOUS; SUBCUTANEOUS
Status: DISCONTINUED | OUTPATIENT
Start: 2022-03-14 | End: 2022-03-14 | Stop reason: HOSPADM

## 2022-03-14 RX ORDER — MIDAZOLAM HYDROCHLORIDE 1 MG/ML
2 INJECTION, SOLUTION INTRAMUSCULAR; INTRAVENOUS
Status: COMPLETED | OUTPATIENT
Start: 2022-03-14 | End: 2022-03-14

## 2022-03-14 RX ORDER — HYDROMORPHONE HYDROCHLORIDE 2 MG/ML
0.5 INJECTION, SOLUTION INTRAMUSCULAR; INTRAVENOUS; SUBCUTANEOUS
Status: DISCONTINUED | OUTPATIENT
Start: 2022-03-14 | End: 2022-03-14 | Stop reason: HOSPADM

## 2022-03-14 RX ORDER — OXYCODONE HYDROCHLORIDE 5 MG/1
5 TABLET ORAL
Status: DISCONTINUED | OUTPATIENT
Start: 2022-03-14 | End: 2022-03-14 | Stop reason: HOSPADM

## 2022-03-14 RX ORDER — HALOPERIDOL 5 MG/ML
1 INJECTION INTRAMUSCULAR
Status: DISCONTINUED | OUTPATIENT
Start: 2022-03-14 | End: 2022-03-14 | Stop reason: HOSPADM

## 2022-03-14 RX ORDER — SODIUM CHLORIDE, SODIUM LACTATE, POTASSIUM CHLORIDE, CALCIUM CHLORIDE 600; 310; 30; 20 MG/100ML; MG/100ML; MG/100ML; MG/100ML
INJECTION, SOLUTION INTRAVENOUS
Status: DISCONTINUED | OUTPATIENT
Start: 2022-03-14 | End: 2022-03-14 | Stop reason: HOSPADM

## 2022-03-14 RX ADMIN — PROPOFOL 300 MG: 10 INJECTION, EMULSION INTRAVENOUS at 07:21

## 2022-03-14 RX ADMIN — FENTANYL CITRATE 25 MCG: 50 INJECTION INTRAMUSCULAR; INTRAVENOUS at 07:34

## 2022-03-14 RX ADMIN — FENTANYL CITRATE 25 MCG: 50 INJECTION INTRAMUSCULAR; INTRAVENOUS at 07:36

## 2022-03-14 RX ADMIN — KETAMINE HYDROCHLORIDE 30 MG: 50 INJECTION INTRAMUSCULAR; INTRAVENOUS at 07:25

## 2022-03-14 RX ADMIN — DEXAMETHASONE SODIUM PHOSPHATE 4 MG: 4 INJECTION, SOLUTION INTRAMUSCULAR; INTRAVENOUS at 07:39

## 2022-03-14 RX ADMIN — PROPOFOL 10 MG: 10 INJECTION, EMULSION INTRAVENOUS at 07:47

## 2022-03-14 RX ADMIN — FENTANYL CITRATE 25 MCG: 50 INJECTION INTRAMUSCULAR; INTRAVENOUS at 07:24

## 2022-03-14 RX ADMIN — HYDROMORPHONE HYDROCHLORIDE 0.4 MG: 2 INJECTION INTRAMUSCULAR; INTRAVENOUS; SUBCUTANEOUS at 08:01

## 2022-03-14 RX ADMIN — SODIUM CHLORIDE, POTASSIUM CHLORIDE, SODIUM LACTATE AND CALCIUM CHLORIDE 100 ML/HR: 600; 310; 30; 20 INJECTION, SOLUTION INTRAVENOUS at 06:02

## 2022-03-14 RX ADMIN — FENTANYL CITRATE 25 MCG: 50 INJECTION INTRAMUSCULAR; INTRAVENOUS at 07:30

## 2022-03-14 RX ADMIN — LABETALOL HYDROCHLORIDE 5 MG: 5 INJECTION INTRAVENOUS at 07:53

## 2022-03-14 RX ADMIN — MIDAZOLAM 1 MG: 1 INJECTION INTRAMUSCULAR; INTRAVENOUS at 07:16

## 2022-03-14 RX ADMIN — MIDAZOLAM 2 MG: 1 INJECTION INTRAMUSCULAR; INTRAVENOUS at 07:01

## 2022-03-14 RX ADMIN — SODIUM CHLORIDE, SODIUM LACTATE, POTASSIUM CHLORIDE, AND CALCIUM CHLORIDE: 600; 310; 30; 20 INJECTION, SOLUTION INTRAVENOUS at 07:16

## 2022-03-14 RX ADMIN — TRANEXAMIC ACID 1 G: 100 INJECTION, SOLUTION INTRAVENOUS at 07:31

## 2022-03-14 RX ADMIN — LABETALOL HYDROCHLORIDE 2.5 MG: 5 INJECTION INTRAVENOUS at 08:01

## 2022-03-14 RX ADMIN — HYDROMORPHONE HYDROCHLORIDE 0.2 MG: 2 INJECTION INTRAMUSCULAR; INTRAVENOUS; SUBCUTANEOUS at 08:09

## 2022-03-14 RX ADMIN — PROPOFOL 50 MG: 10 INJECTION, EMULSION INTRAVENOUS at 07:26

## 2022-03-14 RX ADMIN — FENTANYL CITRATE 25 MCG: 50 INJECTION INTRAMUSCULAR; INTRAVENOUS at 07:45

## 2022-03-14 RX ADMIN — PROPOFOL 10 MG: 10 INJECTION, EMULSION INTRAVENOUS at 07:45

## 2022-03-14 RX ADMIN — FENTANYL CITRATE 25 MCG: 50 INJECTION INTRAMUSCULAR; INTRAVENOUS at 07:28

## 2022-03-14 RX ADMIN — KETOROLAC TROMETHAMINE 30 MG: 30 INJECTION, SOLUTION INTRAMUSCULAR; INTRAVENOUS at 08:12

## 2022-03-14 RX ADMIN — ACETAMINOPHEN 1000 MG: 500 TABLET, FILM COATED ORAL at 05:57

## 2022-03-14 RX ADMIN — LIDOCAINE HYDROCHLORIDE 100 MG: 20 INJECTION, SOLUTION EPIDURAL; INFILTRATION; INTRACAUDAL; PERINEURAL at 07:21

## 2022-03-14 RX ADMIN — ONDANSETRON 4 MG: 2 INJECTION INTRAMUSCULAR; INTRAVENOUS at 07:39

## 2022-03-14 RX ADMIN — FENTANYL CITRATE 25 MCG: 50 INJECTION INTRAMUSCULAR; INTRAVENOUS at 07:39

## 2022-03-14 RX ADMIN — FENTANYL CITRATE 25 MCG: 50 INJECTION INTRAMUSCULAR; INTRAVENOUS at 07:26

## 2022-03-14 NOTE — H&P
Outpatient Surgery History and Physical:  [facility] was seen and examined. CHIEF COMPLAINT:   Right knee stiffness and pain. PE:     Visit Vitals  /80 (BP 1 Location: Right arm, BP Patient Position: Supine)   Pulse 94   Temp 98.4 °F (36.9 °C)   Resp 18   Wt 170 lb (77.1 kg)   SpO2 99%   BMI 24.39 kg/m²       Heart:   Regular rhythm, regular pulses. Lungs:  Are clear, non-labored respirations. Past Medical History:    Patient Active Problem List    Diagnosis    Flexion contracture of right knee    Cyclops lesion of right knee    Surgery follow-up    Rupture of anterior cruciate ligament of right knee       Surgical History:   Past Surgical History:   Procedure Laterality Date    HX ACL RECONSTRUCTION Right 12/1/22    RIGHT KNEE ARTHROSCOPY WITH ANTERIOR CRUCIATE LIGAMENT RECONSTRUCTION W/ QUAD AUTOGRAFT     HX MYRINGOTOMY      tubes inserted       Social History: Patient  reports that he has never smoked. He has never used smokeless tobacco. He reports current alcohol use. He reports current drug use. Drug: Marijuana. Family History: No family history on file. Allergies: Reviewed per EMR  No Known Allergies    Medications:    No current facility-administered medications on file prior to encounter. No current outpatient medications on file prior to encounter. The surgery is planned for the right knee. RIGHT KNEE ARTHROSCOPY LYSIS OF ADHESIONS, MANIPULATION UNDER ANESTHESIA. History and physical has been reviewed. The patient has been examined. There have been no significant clinical changes since the completion of the originally dated History and Physical.  Patient identified by surgeon; surgical site was confirmed by patient and surgeon. The patient is here today for outpatient surgery. I have examined the patient, no changes are noted in the patient's medical status.  Necessity for the procedure/care is still present and the history and physical above is current. See the office notes for the full long term history of the problem. Please see the recent office notes for the musculoskeletal examination. We have already discussed the clinical implications of both conservative and operative treatments. They would like to proceed with operative treatment. I talked with them extensively about the risks, benefits, reasonable expectations and expected recovery time including long term need for ambulatory assistance as well as possible complications including but not limited to bleeding, infection, need for hardware removal or exchange, neurovascular injury, stiffness, pain, dislocation, continued problems, DVT, PE, hardware failure, other fracture, need for further surgery, heterotopic ossification, MI and other anesthesia related risks, etc. They have exhausted all other options and wish to proceed. The patient was counseled at length about the risks of lucy Covid-19 during their perioperative period and any recovery window from their procedure. The patient was made aware that lucy Covid-19  may worsen their prognosis for recovering from their procedure and lend to a higher morbidity and/or mortality risk. All material risks, benefits, and reasonable alternatives including postponing the procedure were discussed. The patient does  wish to proceed with the procedure at this time.       Signed By: Elier Anthony MD     March 14, 2022 6:56 AM

## 2022-03-14 NOTE — PROGRESS NOTES
Pt's appointment was cancelled today due to patient wanting to go to PT at [facility] instead of this clinic secondary to concerns about copay (per Dr. Mila Reilly office).     Angelito Reina, REGT  3/14/2022

## 2022-03-14 NOTE — ANESTHESIA PREPROCEDURE EVALUATION
Anesthetic History   No history of anesthetic complications            Review of Systems / Medical History  Patient summary reviewed and pertinent labs reviewed    Pulmonary          Smoker (Marijuana)         Neuro/Psych   Within defined limits           Cardiovascular  Within defined limits                Exercise tolerance: >4 METS: Prior to injury was active and denied chest pain, SOB, syncope      GI/Hepatic/Renal  Within defined limits              Endo/Other  Within defined limits           Other Findings            Physical Exam    Airway  Mallampati: II  TM Distance: 4 - 6 cm  Neck ROM: normal range of motion   Mouth opening: Normal     Cardiovascular  Regular rate and rhythm,  S1 and S2 normal,  no murmur, click, rub, or gallop  Rhythm: regular  Rate: normal         Dental  No notable dental hx       Pulmonary  Breath sounds clear to auscultation               Abdominal  GI exam deferred       Other Findings            Anesthetic Plan    ASA: 2  Anesthesia type: general  Plan for LMA    Post-op pain plan if not by surgeon: peripheral nerve block single    Induction: Intravenous  Anesthetic plan and risks discussed with: Patient

## 2022-03-14 NOTE — DISCHARGE INSTRUCTIONS
Postoperative Instructions - Knee Arthroscopy    Please note these are general instructions for postoperative care. Specific instructions may be given regarding the type of surgery that you have undergone. 1. Postoperative dressings may be removed after 2 to 3 days. Dressings should be kept dry for the first several days. Following removal of the dressing, wounds should be kept dry when showering. A large trash bag taped to the thigh can work well. Do not soak wounds in the tub prior to suture removal.      2. All steri-strip tapes across the wound should be left in place if your incisions have them. 3. Some discomfort is expected following any operation. You will be given a prescription for pain medication along with instructions for its use. Many pain medications contain Tylenol. Do not take additional Tylenol if you are currently taking the prescribed pain medications. Do not drive while taking pain medications. Do not make important personal or business decisions or sign legal documents the first 24 hours after surgery. If your pain is not adequately controlled, contact your surgeon on call at the numbers on this sheet. 4. Following simple knee arthroscopy crutches are not usually utilized or are used for only 1 or 2 days. Working on regaining full motion of the knee is very important. Bending and straightening the knee and performing ankle range of motion is encouraged to prevent blood clots. Try to use a stationary bike without resistance within the first week of surgery to help regain motion. You may use a brace to help keep it straight at night. 5. Elevating the lower extremity after surgery is helpful in the first few days postoperatively. This can help control swelling. Applying ice for 15 to 20 minutes per hour to the surgical site is often helpful in decreasing pain and swelling.     6. As pain subsides, discontinuation of narcotic medication is recommended and switching to Tylenol or over the counter anti-inflammatory medication is desirable. 7. Pain medications can cause constipation, nausea and vomiting, and sometimes itching and hives. Keeping hydrated by taking liquids on a regular basis can help. For constipation, consider over the counter additions like Metamucil or an over-the-counter stool softner. Trying to limit narcotic use can often help with regards to nausea and vomiting. Taking pain medication with a small amount of food is also usually helpful. Itching and hives can occur with pain medication as well as antibiotics that are given during the renato-operative time. Over the counter Benadryl (25mg every 6 hours) can be helpful in treating itching. 8. If you feel you are progressing slowly, feel free to call our office to get you into physical therapy if you do not already have a prescription. 9. Any fevers (101º or greater) after 2-3 days post-op, increasing redness, drainage, or steadily increasing pain please notify us or come in as soon as possible. 10. Deep vein thrombosis (DVT) is a condition in which a blood clot has formed in a deep vein of the leg. This may result in redness, swelling, warmth and/or pain of the leg. Although these are very rare, there are things that can be done to lessen the risk. - It is recommended by your surgeon unless indicated otherwise, after knee arthroscopy; take an adult aspirin once a day for three weeks after surgery to help prevent the formation of blood clots. (Do not take aspirin against the advice of your medical doctor). -   For several days (at least until you are walking about most of the day), rotate your  ankle, bend your toes and move your foot back and forth and from side to side, frequently (at least a few minutes every hour while you are awake) in order to keep blood circulation flowing so that the blood clotting will be less likely to occur.  The more circulation, the less chance of blood clotting and a DVT. If you call the office please have us paged instead of leaving a voice mail so that we can immediately take care of your needs. Phone [phone]              Fax [phone]          You have medication at [address] to  if you havent already done so. MEDICATION INTERACTION:  During your procedure you potentially received a medication or medications which may reduce the effectiveness of oral contraceptives. Please consider other forms of contraception for 1 month following your procedure if you are currently using oral contraceptives as your primary form of birth control. In addition to this, we recommend continuing your oral contraceptive as prescribed, unless otherwise instructed by your physician, during this time    After general anesthesia or intravenous sedation, for 24 hours or while taking prescription Narcotics:  · Limit your activities  · A responsible adult needs to be with you for the next 24 hours  · Do not drive and operate hazardous machinery  · Do not make important personal or business decisions  · Do not drink alcoholic beverages  · If you have not urinated within 8 hours after discharge, and you are experiencing discomfort from urinary retention, please go to the nearest ED. · If you have sleep apnea and have a CPAP machine, please use it for all naps and sleeping. · Please use caution when taking narcotics and any of your home medications that may cause drowsiness. *  Please give a list of your current medications to your Primary Care Provider. *  Please update this list whenever your medications are discontinued, doses are      changed, or new medications (including over-the-counter products) are added. *  Please carry medication information at all times in case of emergency situations.     These are general instructions for a healthy lifestyle:  No smoking/ No tobacco products/ Avoid exposure to second hand smoke  Surgeon General's Warning:  Quitting smoking now greatly reduces serious risk to your health. Obesity, smoking, and sedentary lifestyle greatly increases your risk for illness  A healthy diet, regular physical exercise & weight monitoring are important for maintaining a healthy lifestyle    You may be retaining fluid if you have a history of heart failure or if you experience any of the following symptoms:  Weight gain of 3 pounds or more overnight or 5 pounds in a week, increased swelling in our hands or feet or shortness of breath while lying flat in bed. Please call your doctor as soon as you notice any of these symptoms; do not wait until your next office visit.

## 2022-03-14 NOTE — ANESTHESIA POSTPROCEDURE EVALUATION
Procedure(s):  RIGHT KNEE ARTHROSCOPY LYSIS OF ADHESIONS, MANIPULATION CHOICE ANES. general    Anesthesia Post Evaluation      Multimodal analgesia: multimodal analgesia used between 6 hours prior to anesthesia start to PACU discharge  Patient location during evaluation: bedside  Patient participation: complete - patient participated  Level of consciousness: awake  Pain management: adequate  Airway patency: patent  Anesthetic complications: no  Cardiovascular status: acceptable  Respiratory status: spontaneous ventilation and acceptable  Hydration status: acceptable  Post anesthesia nausea and vomiting:  none      INITIAL Post-op Vital signs:   Vitals Value Taken Time   /74 03/14/22 0840   Temp 36.4 °C (97.6 °F) 03/14/22 0825   Pulse 86 03/14/22 0846   Resp 16 03/14/22 0842   SpO2 100 % 03/14/22 0846   Vitals shown include unvalidated device data.

## 2022-03-14 NOTE — BRIEF OP NOTE
Brief Postoperative Note    Patient: Jeanette Porras  YOB: 1995  MRN: [id number]    Date of Procedure: 3/14/2022     Pre-Op Diagnosis: Cyclops lesion of right knee [M25.861]  Surgery follow-up [Z09]  Rupture of anterior cruciate ligament of right knee, sequela [S83.511S]    Post-Op Diagnosis: Same as preoperative diagnosis.       Procedure(s):  RIGHT KNEE ARTHROSCOPY LYSIS OF ADHESIONS, MANIPULATION CHOICE ANES    Surgeon(s):  Sophia Torres MD    Surgical Assistant: None    Anesthesia: General     Estimated Blood Loss (mL): Minimal    Complications: None    Specimens: * No specimens in log *     Implants: * No implants in log *    Drains: * No LDAs found *    Findings: see op note    Electronically Signed by Carlos Maier MD on 3/14/2022 at 8:28 AM

## 2022-03-14 NOTE — OP NOTES
Operative Note    Preoperative diagnosis:  Cyclops lesion of right knee [M25.861]  Surgery follow-up [Z09]  Rupture of anterior cruciate ligament of right knee, sequela [S83.511S]    Postoperative diagnosis: Cyclops lesion of right knee [M25.861]  Surgery follow-up [Z09]  Rupture of anterior cruciate ligament of right knee, sequela [S83.511S]    Surgeon(s) and Role:     Silvia Astudillo MD - Primary     Assistant: first loi Shirley, assisted during the procedure. She was necessary for patient positioning, wound closure, and assistance with the major portions of the operation. Her presence decreased the operative time and potential complication rate. Anesthesia: General    Tourniquet Time:   Total Tourniquet Time Documented:  Thigh (Right) - 39 minutes  Total: Thigh (Right) - 39 minutes     At 250 mmHg. Antibiotics: Ancef 2 gram IV    Procedures:  Procedure(s):  RIGHT KNEE ARTHROSCOPY LYSIS OF ADHESIONS, MANIPULATION CHOICE ANES   [address]25    Findings:  1. EUA -   - lachman's and - pivot shift. Stable to varus and valgus. ROM 5 degrees from full extension. Flexion limited to about 95 degrees preoperatively  2. PFJ -normal appearing trochlear and patellofemoral cartilage. In the suprapatellar pouch there was a fair amount of adhesions throughout. This was debrided. 3. Medial Joint -the medial joint carilage appeared fairly normal as well. 4. Lateral joint -the lateral joint carilage appeared normal. The lateral meniscus was stable to probing and showed no tears present. 5. PCL - stable and intact  6. ACL -  stable and intact. ROM ext 0 and flexion 145. Indications / Consent:   this is a patient with symptoms compatible with adhesions and a cyclops lesion. After previous discussions and treatments using both conservative and/or non-operative treatment options the patient elected to proceed with surgery due to continued symptoms.   A review of the risks and benefits, including but not limited to infection, stiffness, injury to nerves and vessels, DVT, PE, MI, need for further operations and other anesthesia related risks was performed with the patient. After this review and the review of the likely outcome and potential complications of the procedure, preoperative verbal and written consents were obtained. The operative procedure and postoperative course were discussed with the patient in detail and the extremity was marked by the patient and myself. Procedure:     the patient was given their anesthetic, placed in a supine position, an EUA was performed and noted above. A lateral post was placed adjacent to the operative leg. The contralateral leg was padded appropriately and lay on the operating table. The surgical leg was prepped with ChloraPrep and draped in the standard fashion. Prior to the beginning of the procedure, a time-out was performed for correct surgical site identification as was marked during the pre-operative meeting. This was confirmed using the written consent and history/physical. Time-out for antibiotic dosing, timing and selection was also performed. An esmarch was used to exsanguinate the leg and the tourniquet was inflated to 250 mmhg. An incision was made and the scope was introduced anterolaterally and an anteromedial portal was developed with the use of spinal needle localization. The patellofemoral joint was viewed and the articular surfaces were normal.  The superior patellar pouch and medial lateral gutters were noted to have a fair amount of scarring and synovitic change. This was debrided back with a shaver and then cauterized with the werewolf Coblation device. The medial compartment was viewed and the articular surfaces were noted to be fairly normal.  A little bit of synovitic change posterior to the PCL near the posterior horn but nothing else abnormal.  The notch was viewed and the ACL and PCL were obstructed by a fairly large cyclops lesion.   Using the shaver and cautery cyclops lesion was removed and the anatomic structures were further identified and defined. The ACL and PCL are felt to be in good condition once the cyclops lesion was removed. The lateral compartment was viewed and probed and the articular surface and lateral meniscus was normal.  Attention was turned back to the anterior tibial interval/compartment. The adhesions were resected and balanced with cautery and motorized leslie until the it was probed and felt to be adequately released. The posteromedial compartment was viewed and no further abnormalities were noted. The scope was then placed superiorly. Any other loose bits of debris were removed from the joint. Once the adhesions were removed, the knee was manipulated gently to obtain 0 extension and 145 flexion fairly easily at this time. Pictures were taken and placed in his media folder. Local anesthetic was injected, 20 ml of Naropin, at the portal sites and intra-articularly. Monofilament sutures were used to close the portals and a sterile Allevyn dressing and an Ace wrap were placed on the knee. The tourniquet was deflated. The patient was returned to the recovery room in a satisfactory condition. All counts were correct. Post-operative plan: Patient will work on ROM and may progress weightbearing as they feel comfortable. They will start PT this afternoon and have another visit tomorrow. I discussed with the patient and his mother preoperatively that he could use the brace at night to sleep locked in full extension. He felt like he needed it in the first couple of days to ambulate he could use that and lock it in order to ambulate and unlocked to work on range of motion. We also discussed the dynamic flexion device/splint which she could use at home in between therapy sessions. Otherwise the patient may refer to post op instructions for wound care and progression of activities.  Enteric Aspirin was discussed for DVT prophylaxis. Will follow up in 1 week for wound check and post op review. Pain medications have been provided. Estimated Blood Loss:   5 ml    Fluids:    See anesthesia record.     Implant: * No implants in log *    Tourniquet Time:   Total Tourniquet Time Documented:  Thigh (Right) - 39 minutes  Total: Thigh (Right) - 78 minutes       Closure: Primary    Complications: None    Signed By: Shonna Gutierrez MD

## 2022-03-19 PROBLEM — Z09 SURGERY FOLLOW-UP: Status: ACTIVE | Noted: 2022-03-01

## 2022-03-19 PROBLEM — M25.861 CYCLOPS LESION OF RIGHT KNEE: Status: ACTIVE | Noted: 2022-03-07

## 2022-03-19 PROBLEM — M24.561 FLEXION CONTRACTURE OF RIGHT KNEE: Status: ACTIVE | Noted: 2022-03-14

## 2022-03-19 PROBLEM — S83.511A RUPTURE OF ANTERIOR CRUCIATE LIGAMENT OF RIGHT KNEE: Status: ACTIVE | Noted: 2022-03-01

## 2022-03-31 PROBLEM — Z09 SURGERY FOLLOW-UP: Status: RESOLVED | Noted: 2022-03-01 | Resolved: 2022-03-31

## 2022-05-27 ENCOUNTER — OFFICE VISIT (OUTPATIENT)
Dept: ORTHOPEDIC SURGERY | Age: 27
End: 2022-05-27

## 2022-05-27 DIAGNOSIS — S83.511S RUPTURE OF ANTERIOR CRUCIATE LIGAMENT OF RIGHT KNEE, SEQUELA: Primary | ICD-10-CM

## 2022-05-27 DIAGNOSIS — Z09 SURGERY FOLLOW-UP: ICD-10-CM

## 2022-05-27 PROCEDURE — 99024 POSTOP FOLLOW-UP VISIT: CPT | Performed by: ORTHOPAEDIC SURGERY

## 2022-05-27 NOTE — PROGRESS NOTES
Not on file     Social Determinants of Health     Financial Resource Strain:     Difficulty of Paying Living Expenses: Not on file   Food Insecurity:     Worried About Running Out of Food in the Last Year: Not on file    Elis of Food in the Last Year: Not on file   Transportation Needs:     Lack of Transportation (Medical): Not on file    Lack of Transportation (Non-Medical): Not on file   Physical Activity:     Days of Exercise per Week: Not on file    Minutes of Exercise per Session: Not on file   Stress:     Feeling of Stress : Not on file   Social Connections:     Frequency of Communication with Friends and Family: Not on file    Frequency of Social Gatherings with Friends and Family: Not on file    Attends [employer] Services: Not on file    Active Member of [address] [place] [address] [employer] or Organizations: Not on file    Attends Club or Organization Meetings: Not on file    Marital Status: Not on file   Intimate Partner Violence:     Fear of Current or Ex-Partner: Not on file    Emotionally Abused: Not on file    Physically Abused: Not on file    Sexually Abused: Not on file   Housing Stability:     Unable to Pay for Housing in the Last Year: Not on file    Number of [place] in the Last Year: Not on file    Unstable Housing in the Last Year: Not on file        No flowsheet data found. Review of Systems  NC    PE right knee:   General: Alert and Oriented x3 and appears to be of stated age. Head and Face: Normocephalic, atraumatic. Neck: Supple, normal range of motion. Lungs: Normal Respiratory rate. No dyspnea. Abd: WNL  Skin: No rash or erythema. Skin is cool to the touch. Surgical incisions appear to be healing well. No sign of infection. Psychiatric: Normal mood and affect. Answers questions appropriately. Musculoskeletal: No erythema. Swelling normal.  Neuro intact. Operative Leg:  ROM:   Extension: 0  Flexion: 140  Good SLR  Good quad tone.   Still has a fair amount of quad atrophy  Lachman stable with good endpoint. Calf soft. Radiographs: Not indicated for today's appointment. A/Plan:     ICD-10-CM    1. Rupture of anterior cruciate ligament of right knee, sequela  S83.511S    2. Surgery follow-up  Z09      Continue strengthening as tolerated per protocol. It was a pleasure seeing them in clinic today. Otherwise if problems they may return on an as needed basis. Patient is aware that they may call our office with any questions or concerns. Discussed he should be transitioning to no more than twice a week with therapy since he is going to the gym. Over the next several weeks transitioning to once a week and then potentially once every other week. No follow-up provider specified.       Maco Lebron MD  05/27/22

## 2022-08-03 ENCOUNTER — OFFICE VISIT (OUTPATIENT)
Dept: ORTHOPEDIC SURGERY | Age: 27
End: 2022-08-03

## 2022-08-03 DIAGNOSIS — S83.511S RUPTURE OF ANTERIOR CRUCIATE LIGAMENT OF RIGHT KNEE, SEQUELA: Primary | ICD-10-CM

## 2022-08-03 DIAGNOSIS — Z09 SURGERY FOLLOW-UP: ICD-10-CM

## 2022-08-03 PROCEDURE — 99212 OFFICE O/P EST SF 10 MIN: CPT | Performed by: ORTHOPAEDIC SURGERY

## 2022-08-03 NOTE — PROGRESS NOTES
Name: Mallory Lawrence  YOB: 1995  Gender: male  MRN: [id number]    CC:   Chief Complaint   Patient presents with    Knee Pain     Recheck s/p right knee THERESE/ANKUR - DOS 3/14/22; s/p right knee scope ACL recon w/ quad auto - DOS 12/1/21   ,Right Knee Arthroscopy Lysis Of Adhesions, Manipulation Choice Anes - Right   3/14/2022   Right Knee Arthroscopy With Anterior Cruciate Ligament Reconstruction W/ Quad Autograft - Right   12/1/2021    HPI: Patient is now 8 months s/p ACL reconstruction. Patient is doing well. They are able to perform ADLs without problem. They are working on strengthening the quadriceps and hamstring muscles further. Patient denies use of pain medication. Patient is without complaints today. Doing well. No Known Allergies  Past Medical History:   Diagnosis Date    COVID-19 01/05/2022    denies hospitalization, symptoms: body aches and loss of smell    Marijuana smoker 11/29/2021    1-4 times daily (7-28 x week) - also vapes THC    Vapes nicotine containing substance     and THC     Past Surgical History:   Procedure Laterality Date    ANTERIOR CRUCIATE LIGAMENT REPAIR Right 12/1/22    RIGHT KNEE ARTHROSCOPY WITH ANTERIOR CRUCIATE LIGAMENT RECONSTRUCTION W/ QUAD AUTOGRAFT     MYRINGOTOMY      tubes inserted     History reviewed. No pertinent family history. Social History     Socioeconomic History    Marital status: Single     Spouse name: Not on file    Number of children: Not on file    Years of education: Not on file    Highest education level: Not on file   Occupational History    Not on file   Tobacco Use    Smoking status: Never    Smokeless tobacco: Never   Substance and Sexual Activity    Alcohol use:  Yes     Alcohol/week: 0.0 - 1.0 standard drinks    Drug use: Yes     Types: Marijuana Nicole Mustard)    Sexual activity: Not on file   Other Topics Concern    Not on file   Social History Narrative    Not on file     Social Determinants of Health     Financial Resource Strain: Not on file   Food Insecurity: Not on file   Transportation Needs: Not on file   Physical Activity: Not on file   Stress: Not on file   Social Connections: Not on file   Intimate Partner Violence: Not on file   Housing Stability: Not on file        No flowsheet data found. Review of Systems  NC    PE right knee:   General: Alert and Oriented x3 and appears to be of stated age. Head and Face: Normocephalic, atraumatic. Neck: Supple, normal range of motion. Lungs: Normal Respiratory rate. No dyspnea. Abd: WNL  Skin: No rash or erythema. Skin is cool to the touch. Surgical incisions appear to be healing well. No sign of infection. Psychiatric: Normal mood and affect. Answers questions appropriately. Musculoskeletal: No erythema. Swelling normal.  Neuro intact. Operative Leg:  ROM:   Extension: 0  Flexion: 135  Good SLR  Good quad tone. Lachman stable with good endpoint. Calf soft. Radiographs: Not indicated for today's appointment. A/Plan:     ICD-10-CM    1. Rupture of anterior cruciate ligament of right knee, sequela  S83.511S       2. Surgery follow-up  Z09         Continue strengthening as tolerated per protocol. It was a pleasure seeing them in clinic today. Otherwise if problems they may return on an as needed basis. Patient is aware that they may call our office with any questions or concerns. Discussed and would not recommend return to full sport for at least 2 months or more. He is on board with this plan. Return for As discussed.       Sara Chirinos MD  08/03/22

## (undated) DEVICE — SUTURE TIGERSTICK TIGERWIRE SZ 2-0 L50IN NONABSORBABLE AR7209T

## (undated) DEVICE — NEEDLE SUT PASS FOR ROT CUF LABRAL REP MULTFI SCORPION

## (undated) DEVICE — SUTURE FIBERWIRE FIBERSTICK SZ 2-0 L50/12IN NONABSORBABLE AR7209

## (undated) DEVICE — PREP SKN CHLRAPRP APL 26ML STR --

## (undated) DEVICE — 2.4 MM X 15 INCH DRILL TIP PASSING                                    PIN, STERILE: Brand: ENDOBUTTON

## (undated) DEVICE — BUR SHV L13CM DIA5MM 8 FLUT OVL FLUSHCUT AGG COOLCUT

## (undated) DEVICE — BLADE SHV L13CM DIA4MM CVD DISECT AGG COOLCUT

## (undated) DEVICE — BLADE SHV L13CM DIA4MM DISECT AGG COOLCUT

## (undated) DEVICE — STERILE HOOK LOCK LATEX FREE ELASTIC BANDAGE 6INX5YD: Brand: HOOK LOCK™

## (undated) DEVICE — SUTURE VCRL SZ 0 L36IN ABSRB UD L36MM CT-1 1/2 CIR J946H

## (undated) DEVICE — SUTURE FIBERWIRE SZ 2 L38IN NONABSORBABLE BLU WHT BLK AR7201

## (undated) DEVICE — CANNULA ARTHSCP L3CM ID8MM DBL DAM 1 PC MOLD LO PROF FLNG

## (undated) DEVICE — SUTURE VCRL SZ 0 L27IN ABSRB UD L36MM CP-1 1/2 CIR REV CUT J267H

## (undated) DEVICE — REM POLYHESIVE ADULT PATIENT RETURN ELECTRODE: Brand: VALLEYLAB

## (undated) DEVICE — Device

## (undated) DEVICE — BUTTON SWITCH PENCIL BLADE ELECTRODE, HOLSTER: Brand: EDGE

## (undated) DEVICE — WEREWOLF FLOW 90 COBLATION WAND: Brand: COBLATION

## (undated) DEVICE — SKIN MARKER,REGULAR TIP WITH RULER AND LABELS: Brand: DEVON

## (undated) DEVICE — DRAPE,ARTHRO,W/POUCH,STERILE: Brand: MEDLINE

## (undated) DEVICE — STRIP,CLOSURE,WOUND,MEDI-STRIP,1/2X4: Brand: MEDLINE

## (undated) DEVICE — WATERPROOF, BACTERIA PROOF DRESSING WITH ABSORBENT SEE THROUGH PAD: Brand: OPSITE POST-OP VISIBLE 15X10CM CTN 20

## (undated) DEVICE — PROBE ABLAT 90DEG ASPIR MULTIPORT BPLR RF 1 PC ELECTRD ERGO

## (undated) DEVICE — STOCKINETTE,IMPERVIOUS,12X48,STERILE: Brand: MEDLINE

## (undated) DEVICE — 4-PORT MANIFOLD: Brand: NEPTUNE 2

## (undated) DEVICE — WATERPROOF, BACTERIA PROOF DRESSING WITH ABSORBENT SEE THROUGH PAD: Brand: OPSITE POST-OP VISIBLE 20X10CM CTN 20

## (undated) DEVICE — T-DRAPE,EXTREMITY,STERILE: Brand: MEDLINE

## (undated) DEVICE — TUBING PMP L16FT MAIN DISP FOR AR-6400 AR-6475

## (undated) DEVICE — DRILL SURG FLIPCUTTER III

## (undated) DEVICE — DRAPE,U/SHT,SPLIT,FILM,60X84,STERILE: Brand: MEDLINE

## (undated) DEVICE — INTENDED FOR TISSUE SEPARATION, AND OTHER PROCEDURES THAT REQUIRE A SHARP SURGICAL BLADE TO PUNCTURE OR CUT.: Brand: BARD-PARKER ® STAINLESS STEEL BLADES

## (undated) DEVICE — COTTON UNDERCAST PADDING,REGULAR FINISH: Brand: WEBRIL

## (undated) DEVICE — BLADE SHV L13CM DIA4MM CRV DBL CUT COOLCUT

## (undated) DEVICE — SUTURE TIGERLOOP SZ 2-0 L20IN NONABSORBABLE WHT GRN BRAID AR7234T

## (undated) DEVICE — STRIPPER TENDON QUADPRO HARVESTER 10MM

## (undated) DEVICE — SUTURE STRATAFIX SPRL MCRYL + SZ 3-0 L18IN ABSRB UD PS-2 SXMP1B107

## (undated) DEVICE — ZIMMER® STERILE DISPOSABLE TOURNIQUET CUFF WITH PLC, DUAL PORT, SINGLE BLADDER, 30 IN. (76 CM)

## (undated) DEVICE — ZIP 16 SURGICAL SKIN CLOSURE DEVICE: Brand: ZIP 16 SURGICAL SKIN CLOSURE DEVICE

## (undated) DEVICE — 3M™ IOBAN™ 2 ANTIMICROBIAL INCISE DRAPE 6650EZ: Brand: IOBAN™ 2

## (undated) DEVICE — GOWN,PREVENTION PLUS,XL,ST,24/CS: Brand: MEDLINE

## (undated) DEVICE — BRACE KNEE FOR 26IN FOAM TELSCP FULL TECHNOLOGY QUIK LOK

## (undated) DEVICE — MASTISOL ADHESIVE LIQ 2/3ML

## (undated) DEVICE — SINGLE PORT MANIFOLD: Brand: NEPTUNE 2

## (undated) DEVICE — SUTURE MCRYL SZ 2-0 L27IN ABSRB UD CP-1 1 L36MM 1/2 CIR REV Y266H

## (undated) DEVICE — BLADE SURG NO15 S STL STR DISP GLASSVAN

## (undated) DEVICE — NDL SPNE QNCKE 18GX3.5IN LF --

## (undated) DEVICE — KNEE ARTHROSCOPY DR KOCH: Brand: MEDLINE INDUSTRIES, INC.

## (undated) DEVICE — BANDAGE COBAN 6 IN WND 6INX5YD FOAM

## (undated) DEVICE — PRECISION THIN (9.0 X 0.38 X 31.0MM)

## (undated) DEVICE — CARDINAL HEALTH FLEXIBLE LIGHT HANDLE COVER: Brand: CARDINAL HEALTH

## (undated) DEVICE — DRAPE TWL SURG 16X26IN BLU ORB04] ALLCARE INC]

## (undated) DEVICE — SOLUTION IRRIG 3000ML 0.9% SOD CHL FLX CONT 0797208] ICU MEDICAL INC]